# Patient Record
Sex: FEMALE | Race: OTHER | Employment: OTHER | URBAN - METROPOLITAN AREA
[De-identification: names, ages, dates, MRNs, and addresses within clinical notes are randomized per-mention and may not be internally consistent; named-entity substitution may affect disease eponyms.]

---

## 2017-01-17 ENCOUNTER — ANESTHESIA EVENT (OUTPATIENT)
Dept: SURGERY | Facility: AMBULARY SURGERY CENTER | Age: 62
End: 2017-01-17
Payer: MEDICARE

## 2017-01-17 ENCOUNTER — TELEPHONE (OUTPATIENT)
Dept: OPHTHALMOLOGY | Facility: CLINIC | Age: 62
End: 2017-01-17

## 2017-01-17 NOTE — TELEPHONE ENCOUNTER
----- Message from Venice Molina sent at 1/17/2017 12:08 PM CST -----  Contact: Loulou with Dr. Josh Jamil with Dr. Moreno office would like to speak to a nurse regarding a surgery clearance  She faxed paper work yesterday and she received something today   Please call  options 6   Thanks

## 2017-01-18 ENCOUNTER — ANESTHESIA (OUTPATIENT)
Dept: SURGERY | Facility: AMBULARY SURGERY CENTER | Age: 62
End: 2017-01-18
Payer: MEDICARE

## 2017-01-18 ENCOUNTER — SURGERY (OUTPATIENT)
Age: 62
End: 2017-01-18

## 2017-01-18 PROBLEM — H25.10 NUCLEAR SCLEROSIS: Status: ACTIVE | Noted: 2017-01-18

## 2017-01-18 PROCEDURE — D9220A PRA ANESTHESIA: Mod: CRNA,,, | Performed by: NURSE ANESTHETIST, CERTIFIED REGISTERED

## 2017-01-18 PROCEDURE — D9220A PRA ANESTHESIA: Mod: ANES,,, | Performed by: ANESTHESIOLOGY

## 2017-01-18 RX ADMIN — ALFENTANIL HYDROCHLORIDE 500 MCG: 500 INJECTION INTRAVENOUS at 10:01

## 2017-01-18 RX ADMIN — ONDANSETRON 4 MG: 2 INJECTION INTRAMUSCULAR; INTRAVENOUS at 09:01

## 2017-01-18 RX ADMIN — ALFENTANIL HYDROCHLORIDE 500 MCG: 500 INJECTION INTRAVENOUS at 09:01

## 2017-01-18 RX ADMIN — MOXIFLOXACIN 1 DROP: 5 SOLUTION/ DROPS OPHTHALMIC at 10:01

## 2017-01-18 RX ADMIN — PROPARACAINE HYDROCHLORIDE 1 DROP: 5 SOLUTION/ DROPS OPHTHALMIC at 09:01

## 2017-01-18 RX ADMIN — EPINEPHRINE CONVENIENCE KIT 0.3 MG: KIT INTRAMUSCULAR; SUBCUTANEOUS at 09:01

## 2017-01-18 RX ADMIN — LIDOCAINE HYDROCHLORIDE 1 ML: 10 INJECTION, SOLUTION EPIDURAL; INFILTRATION; INTRACAUDAL; PERINEURAL at 10:01

## 2017-01-18 RX ADMIN — SODIUM CHLORIDE, SODIUM LACTATE, POTASSIUM CHLORIDE, CALCIUM CHLORIDE: 600; 310; 30; 20 INJECTION, SOLUTION INTRAVENOUS at 09:01

## 2017-01-18 NOTE — ANESTHESIA PREPROCEDURE EVALUATION
01/18/2017  Alida Chacon is a 61 y.o., female.    [facility] Anesthesia Evaluation    I have reviewed the Patient Summary Reports.    I have reviewed the Nursing Notes.   I have reviewed the Medications.     Review of Systems  Anesthesia Hx:  Hx of Anesthetic complications  Personal Hx of Anesthesia complications  Paradoxical Response To Benzodiazapines (Versed)       Physical Exam  General:  Morbid Obesity    Airway/Jaw/Neck:  Airway Findings: Mouth Opening: Normal Tongue: Normal  General Airway Assessment: Adult, Average  Mallampati: II  TM Distance: Normal, at least 6 cm       Chest/Lungs:  Chest/Lungs Findings: Clear to auscultation, Normal Respiratory Rate     Heart/Vascular:  Heart Findings:       Mental Status:  Mental Status Findings:  Cooperative, Alert and Oriented         Anesthesia Plan  Type of Anesthesia, risks & benefits discussed:  Anesthesia Type:  MAC  Patient's Preference:   Intra-op Monitoring Plan:   Intra-op Monitoring Plan Comments:   Post Op Pain Control Plan:   Post Op Pain Control Plan Comments:   Induction:   IV  Beta Blocker:  Patient is not currently on a Beta-Blocker (No further documentation required).       Informed Consent: Patient understands risks and agrees with Anesthesia plan.  Questions answered. Anesthesia consent signed with patient.  ASA Score: 3     Day of Surgery Review of History & Physical: I have interviewed and examined the patient. I have reviewed the patient's H&P dated:  There are no significant changes.          Ready For Surgery From Anesthesia Perspective.

## 2017-01-18 NOTE — TRANSFER OF CARE
"Anesthesia Transfer of Care Note    Patient: Alida Chacon    Procedure(s) Performed: Procedure(s) (LRB):  phaco/pciol (Right)    Patient location: PACU    Anesthesia Type: MAC    Transport from OR: Transported from OR on room air with adequate spontaneous ventilation    Post pain: adequate analgesia    Post assessment: no apparent anesthetic complications and tolerated procedure well    Post vital signs: stable    Level of consciousness: awake    Nausea/Vomiting: no nausea/vomiting    Complications: none          Last vitals:   Visit Vitals    /62 (BP Location: Right arm, Patient Position: Lying)    Pulse (!) 52    Temp 36.6 °C (97.9 °F) (Oral)    Resp 20    Ht 5' 3" (1.6 m)    Wt 108.9 kg (240 lb)    SpO2 99%    Breastfeeding No    BMI 42.51 kg/m2     "

## 2017-01-18 NOTE — ANESTHESIA POSTPROCEDURE EVALUATION
"Anesthesia Post Evaluation    Patient: Alida Chacon    Procedure(s) Performed: Procedure(s) (LRB):  phaco/pciol (Right)    Final Anesthesia Type: general  Patient location during evaluation: PACU  Patient participation: Yes- Able to Participate  Level of consciousness: awake and alert and oriented  Post-procedure vital signs: reviewed and stable  Pain management: adequate  Airway patency: patent  PONV status at discharge: No PONV  Anesthetic complications: no      Cardiovascular status: blood pressure returned to baseline  Respiratory status: unassisted, spontaneous ventilation and room air  Hydration status: euvolemic  Follow-up not needed.        Visit Vitals    /67    Pulse 64    Temp 36.6 °C (97.8 °F) (Oral)    Resp 20    Ht 5' 3" (1.6 m)    Wt 108.9 kg (240 lb)    SpO2 95%    Breastfeeding No    BMI 42.51 kg/m2       Pain/Surya Score: Pain Assessment Performed: Yes (1/18/2017 10:50 AM)  Presence of Pain: denies (1/18/2017 10:50 AM)  Surya Score: 10 (1/18/2017 10:50 AM)      "

## 2017-01-19 ENCOUNTER — OFFICE VISIT (OUTPATIENT)
Dept: OPHTHALMOLOGY | Facility: CLINIC | Age: 62
End: 2017-01-19
Payer: MEDICARE

## 2017-01-19 DIAGNOSIS — H25.13 NUCLEAR SCLEROSIS, BILATERAL: ICD-10-CM

## 2017-01-19 DIAGNOSIS — H52.203 MYOPIA WITH ASTIGMATISM AND PRESBYOPIA, BILATERAL: ICD-10-CM

## 2017-01-19 DIAGNOSIS — H52.4 MYOPIA WITH ASTIGMATISM AND PRESBYOPIA, BILATERAL: ICD-10-CM

## 2017-01-19 DIAGNOSIS — H52.13 MYOPIA WITH ASTIGMATISM AND PRESBYOPIA, BILATERAL: ICD-10-CM

## 2017-01-19 DIAGNOSIS — Z98.890 POST-OPERATIVE STATE: Primary | ICD-10-CM

## 2017-01-19 PROCEDURE — 99214 OFFICE O/P EST MOD 30 MIN: CPT | Mod: PBBFAC,PO | Performed by: OPHTHALMOLOGY

## 2017-01-19 PROCEDURE — 99999 PR PBB SHADOW E&M-EST. PATIENT-LVL IV: CPT | Mod: PBBFAC,,, | Performed by: OPHTHALMOLOGY

## 2017-01-19 PROCEDURE — 99024 POSTOP FOLLOW-UP VISIT: CPT | Mod: ,,, | Performed by: OPHTHALMOLOGY

## 2017-01-19 RX ORDER — PANCRELIPASE 24000; 76000; 120000 [USP'U]/1; [USP'U]/1; [USP'U]/1
CAPSULE, DELAYED RELEASE PELLETS ORAL
COMMUNITY
Start: 2016-11-28 | End: 2018-05-09 | Stop reason: SDUPTHER

## 2017-01-19 RX ORDER — LINACLOTIDE 290 UG/1
290 CAPSULE, GELATIN COATED ORAL
COMMUNITY
Start: 2016-11-28 | End: 2019-07-01

## 2017-01-19 RX ORDER — TIMOLOL MALEATE 5 MG/ML
1 SOLUTION/ DROPS OPHTHALMIC 2 TIMES DAILY
Qty: 5 ML | Refills: 1 | Status: SHIPPED | OUTPATIENT
Start: 2017-01-19 | End: 2017-01-23 | Stop reason: ALTCHOICE

## 2017-01-19 RX ORDER — CIPROFLOXACIN AND DEXAMETHASONE 3; 1 MG/ML; MG/ML
SUSPENSION/ DROPS AURICULAR (OTIC)
COMMUNITY
Start: 2016-12-01 | End: 2018-11-13

## 2017-01-19 NOTE — PROGRESS NOTES
HPI     Post-op Evaluation    Additional comments: 1 d po phaco iol OD d 1/18//           Comments   1 d po phaco iol OD d 1/18//  Drops instilled as directed//    Agree with above. Keeps getting flashes through the night, like optical   migraine - was getting before surgery as well.        Last edited by Steffanie Caraballo MD on 1/19/2017  8:57 AM.   (History)            Assessment /Plan     For exam results, see Encounter Report.    Post-operative state    Nuclear sclerosis, bilateral    Myopia with astigmatism and presbyopia, bilateral    Other orders  -     timolol maleate 0.5% (TIMOPTIC) 0.5 % Drop; Place 1 drop into the right eye 2 (two) times daily.  Dispense: 5 mL; Refill: 1            Nuclear sclerosis, bilateral - POD #1 s/p Phaco/PCIOL OD. IOP up a bit, otherwise doing well. Continue Besifloxacin QID, Lotemax QID, and Ilevro QD, add Timolol BID through Sunday am. Precautions reviewed. RTC 1 week  dilates pretty well on just 1%, denies flomax. Has a lot of allergies did ok with Besivance ahead of time with NIRAJ. Nucleus does not look terribly dense, but hazy view of macula and dim red reflex on retinoscopy OD only. 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 Has worn contacts in past. [employer] -[employer]. Lid hygiene handout given prior visit.    Myopia with astigmatism and presbyopia, bilateral - Cannot tolerate progressives, prefers to do needlework without glasses. Has worn contacts in past. [employer] -[employer]00.

## 2017-01-19 NOTE — PATIENT INSTRUCTIONS
Continue Ilevro once daily. QD, add Timolol BID through Sunday am.    Continue other surgery drops 4x per day (Besifloxacin, Lotemax).    Use Timolol (pressure drop, yellow lid) 2x per day until Sunday morning then stop. CONTINUE ALL OTHER DROPS.     Call MD immediately if signs of infection occur (pain, redness, blurred vision). Do not wait for next appointment.    Call MD immediately if you experience new floaters/shadows in your vision, flashes of light, or a curtain or veil appearing to in your vision.    Cell number [phone].

## 2017-01-20 ENCOUNTER — TELEPHONE (OUTPATIENT)
Dept: OPHTHALMOLOGY | Facility: CLINIC | Age: 62
End: 2017-01-20

## 2017-01-20 NOTE — TELEPHONE ENCOUNTER
----- Message from Nina Hsu sent at 1/20/2017 11:37 AM CST -----  Contact: [phone]  Pt is calling to speak with the nurse regarding the eye drops given to her.pleae call to advise/thanks

## 2017-01-23 ENCOUNTER — OFFICE VISIT (OUTPATIENT)
Dept: OPHTHALMOLOGY | Facility: CLINIC | Age: 62
End: 2017-01-23
Payer: MEDICARE

## 2017-01-23 DIAGNOSIS — Z98.890 POST-OPERATIVE STATE: Primary | ICD-10-CM

## 2017-01-23 DIAGNOSIS — H52.4 MYOPIA WITH ASTIGMATISM AND PRESBYOPIA, BILATERAL: ICD-10-CM

## 2017-01-23 DIAGNOSIS — H25.13 NUCLEAR SCLEROSIS, BILATERAL: ICD-10-CM

## 2017-01-23 DIAGNOSIS — H52.203 MYOPIA WITH ASTIGMATISM AND PRESBYOPIA, BILATERAL: ICD-10-CM

## 2017-01-23 DIAGNOSIS — H52.13 MYOPIA WITH ASTIGMATISM AND PRESBYOPIA, BILATERAL: ICD-10-CM

## 2017-01-23 PROCEDURE — 99024 POSTOP FOLLOW-UP VISIT: CPT | Mod: ,,, | Performed by: OPHTHALMOLOGY

## 2017-01-23 PROCEDURE — 99214 OFFICE O/P EST MOD 30 MIN: CPT | Mod: PBBFAC,PO | Performed by: OPHTHALMOLOGY

## 2017-01-23 PROCEDURE — 99999 PR PBB SHADOW E&M-EST. PATIENT-LVL IV: CPT | Mod: PBBFAC,,, | Performed by: OPHTHALMOLOGY

## 2017-01-23 NOTE — PATIENT INSTRUCTIONS
Continue Ilevro daily until 2/17/17    Continue Besifloxacin 4x per day until 1/27/17    Continue Lotemax 4x per day until 1/27, then decrease to 3x per day for 1 week, then 2x per day for 1 week, then once daily for 1 week then stop.

## 2017-01-23 NOTE — PROGRESS NOTES
HPI     Post-op Evaluation    Additional comments: phaco iol OD d 1/18/17//  drops instilled as   directed//           Comments   phaco iol OD d 1/18/17//  drops instilled as directed//    Agree with above. Last dose of Timolol yesterday am as instructed.       Last edited by Steffanie Caraballo MD on 1/23/2017  2:16 PM.   (History)            Assessment /Plan     For exam results, see Encounter Report.    Post-operative state    Nuclear sclerosis, bilateral    Myopia with astigmatism and presbyopia, bilateral          Nuclear sclerosis, bilateral - POD #5 s/p Phaco/PCIOL OD. IOP good off Timolol, doing well. Continue Besifloxacin QID, Lotemax QID, and Ilevro QD until Friday, then d/c Besifloxacin, taper Lotemax and continue Ilevro QD.  dilates pretty well on just 1%, denies flomax. Has a lot of allergies did ok with Besivance ahead of time with [facility]AJ. Nucleus does not look terribly dense, but hazy view of macula and dim red reflex on retinoscopy OD only. 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 Has worn contacts in past. [employer] -3[employer]00. Lid hygiene handout given prior visit.    Myopia with astigmatism and presbyopia, bilateral - Cannot tolerate progressives, prefers to do needlework without glasses. Has worn contacts in past. [employer] -3.00.

## 2017-01-23 NOTE — MR AVS SNAPSHOT
[place] [address] - Ophthalmology  [address] [place] [address] Drive [address]  [place] [address]  Phone: [phone]                  Alida Chacon   2017 2:00 PM   Office Visit    Description:  Female : 1955   Provider:  Steffanie Caraballo MD   Department:  [place] [facility] - Ophthalmology           Reason for Visit     Post-op Evaluation           Diagnoses this Visit        Comments    Post-operative state    -  Primary     Nuclear sclerosis, bilateral         Myopia with astigmatism and presbyopia, bilateral                To Do List           Future Appointments        Provider Department Dept Phone    3/2/2017 10:15 AM [place] [facility] [address] [facility] [phone]      Goals (5 Years of Data)     None      Follow-Up and Disposition     Return for Post op visit.      [facility]     [facility] Nurse Care [facility] -  Assistance  Registered nurses in the [facility] On Call Center provide clinical advisement, health education, appointment booking, and other advisory services.  Call for this free service at [phone].             Medications           Message regarding Medications     Verify the changes and/or additions to your medication regime listed below are the same as discussed with your clinician today.  If any of these changes or additions are incorrect, please notify your healthcare provider.        STOP taking these medications     timolol maleate 0.5% (TIMOPTIC) 0.5 % Drop Place 1 drop into the right eye 2 (two) times daily.           Verify that the below list of medications is an accurate representation of the medications you are currently taking.  If none reported, the list may be blank. If incorrect, please contact your healthcare provider. Carry this list with you in case of emergency.           Current Medications     alendronate (FOSAMAX) 70 MG tablet TK 1 T PO ONCE WEEKLY    besifloxacin (BESIVANCE) 0.6 % DrpS Place 1 drop into the right  eye 4 (four) times daily. Start 1 day before surgery.    calcium carbonate (OS-JACQUELINE) 600 mg (1,500 mg) Tab Take 600 mg by mouth 2 (two) times daily with meals.    cetirizine (ZYRTEC) 10 MG tablet Take 10 mg by mouth daily as needed for Allergies.    CIPRODEX 0.3-0.1 % DrpS     CREON 24,000-76,000 -120,000 unit capsule     diazePAM (VALIUM) 5 MG tablet TK 1 T PO Q 8 H PRN    docusate sodium (COLACE) 100 MG capsule Take 100 mg by mouth 2 (two) times daily.    epinephrine (EPIPEN JR) 0.15 mg/0.3 mL pen injection Inject 0.15 mg into the muscle as needed for Anaphylaxis.    ergocalciferol (ERGOCALCIFEROL) 50,000 unit Cap TK 1 C PO  Q WEEKLY    ethacrynic acid (EDECRIN) 25 mg Tab Take 25 mg by mouth 2 (two) times daily.    irbesartan (AVAPRO) 150 MG tablet TK 1 T PO QD    levocetirizine (XYZAL) 5 MG tablet Take 5 mg by mouth every evening.    LINZESS 290 mcg Cap     loteprednol etabonate 0.5 % DrpG Place 1 drop into the right eye 4 (four) times daily. Start right after surgery.    magnesium oxide (MAG-OX) 400 mg tablet Take 400 mg by mouth once daily.    nepafenac (ILEVRO) 0.3 % DrpS Place 1 drop into the right eye once daily. Start 3 days before surgery.    omeprazole (PRILOSEC) 40 MG capsule TK 1 C PO  QD    ondansetron (ZOFRAN) 4 MG tablet Take 8 mg by mouth 2 (two) times daily.    oxycodone (ROXICODONE) 15 MG Tab TK 1 T PO QID PRN    OXYCONTIN 20 mg 12 hr tablet TK 1 T PO Q 12 H    OXYGEN-AIR DELIVERY SYSTEMS MISC 2 L by Nasal route nightly as needed.    predniSONE (DELTASONE) 5 MG tablet TAKE ONE TABLET BY MOUTHY DAILY WITH FOOD    promethazine (PHENERGAN) 25 MG tablet Take 25 mg by mouth every 4 (four) hours as needed for Nausea.    tacrolimus (PROGRAF) 1 MG Cap TK 3 CS PO BID    triazolam (HALCION) 0.25 MG Tab Take 0.125 mg by mouth nightly as needed.    warfarin (COUMADIN) 7.5 MG tablet TK 1 T PO QD AT 5 PM           Clinical Reference Information           Allergies as of 1/23/2017     Versed [Midazolam]     Compazine [Prochlorperazine Edisylate]    Cortisone    Cytoxan [Cyclophosphamide]    Lasix [Furosemide]    Morphine    Penicillins    Preservatives [Preservative]    Sulfa (Sulfonamide Antibiotics)    Tetracyclines    Vicodin [Hydrocodone-acetaminophen]      Immunizations Administered on Date of Encounter - 1/23/2017     None      Instructions      Continue Ilevro daily until 2/17/17    Continue Besifloxacin 4x per day until 1/27/17    Continue Lotemax 4x per day until 1/27, then decrease to 3x per day for 1 week, then 2x per day for 1 week, then once daily for 1 week then stop.

## 2017-01-27 RX ORDER — LOTEPREDNOL ETABONATE 5 MG/G
1 GEL OPHTHALMIC 4 TIMES DAILY
Qty: 5 G | Refills: 2
Start: 2017-01-27 | End: 2017-03-02

## 2017-01-27 NOTE — TELEPHONE ENCOUNTER
----- Message from Marilynn Huff sent at 1/27/2017  9:22 AM CST -----  Contact: pt [phone]  Patient called and asked if you will ida in a new prescription for Lotemax .5% gel  [facility] [place]

## 2017-03-02 ENCOUNTER — OFFICE VISIT (OUTPATIENT)
Dept: OPHTHALMOLOGY | Facility: CLINIC | Age: 62
End: 2017-03-02
Payer: MEDICARE

## 2017-03-02 DIAGNOSIS — H52.203 MYOPIA WITH ASTIGMATISM AND PRESBYOPIA, BILATERAL: ICD-10-CM

## 2017-03-02 DIAGNOSIS — Z98.890 POST-OPERATIVE STATE: Primary | ICD-10-CM

## 2017-03-02 DIAGNOSIS — H25.13 NUCLEAR SCLEROSIS, BILATERAL: ICD-10-CM

## 2017-03-02 DIAGNOSIS — H52.4 MYOPIA WITH ASTIGMATISM AND PRESBYOPIA, BILATERAL: ICD-10-CM

## 2017-03-02 DIAGNOSIS — H52.13 MYOPIA WITH ASTIGMATISM AND PRESBYOPIA, BILATERAL: ICD-10-CM

## 2017-03-02 PROCEDURE — 99999 PR PBB SHADOW E&M-EST. PATIENT-LVL IV: CPT | Mod: PBBFAC,,, | Performed by: OPHTHALMOLOGY

## 2017-03-02 PROCEDURE — 99024 POSTOP FOLLOW-UP VISIT: CPT | Mod: ,,, | Performed by: OPHTHALMOLOGY

## 2017-03-02 PROCEDURE — 99214 OFFICE O/P EST MOD 30 MIN: CPT | Mod: PBBFAC,PO | Performed by: OPHTHALMOLOGY

## 2017-03-02 RX ORDER — OXYCODONE HYDROCHLORIDE 10 MG/1
TABLET ORAL
COMMUNITY
Start: 2017-02-27 | End: 2018-11-05 | Stop reason: CLARIF

## 2017-03-02 NOTE — PROGRESS NOTES
"HPI     Post-op Evaluation    Additional comments: 1 month sp phaco iol OD d 1/18//           Eye Problem    Additional comments: pt eyes are burning on and off//  since finished   drops//           Comments   1 month sp phaco iol OD d 1/18//  Drops finished as directed//pt eyes are   burning on and off//  since finished drops//       Last edited by Ofelia Rodarte on 3/2/2017 10:21 AM. (History)            Assessment /Plan     For exam results, see Encounter Report.    Post-operative state    Nuclear sclerosis, bilateral    Myopia with astigmatism and presbyopia, bilateral            Nuclear sclerosis, bilateral - s/p Phaco/PCIOL OD. IOP good off Timolol, doing well. I recommend that you use artificial tears 2-3 times daily. Recommended brands include Systane, Refresh, Genteal, and Thera-tears. Generic drops are okay too. Avoid any drops that say they "get the red out" - these should not be used on a regular basis.    dilates pretty well on just 1%, denies flomax. Has a lot of allergies did ok with Besivance ahead of time with NIRAJ. Nucleus does not look terribly dense, but hazy view of macula and dim red reflex on retinoscopy OD only. 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 Has worn contacts in past. [employer] -3.00. Lid hygiene handout given prior visit.    Myopia with astigmatism and presbyopia, bilateral - Cannot tolerate progressives, prefers to do needlework without glasses. Has worn contacts in past. [employer] -3.00. MRx given today.                     "

## 2017-03-02 NOTE — MR AVS SNAPSHOT
[facility] - Ophthalmology  [address] [place] [address] Drive [address]  [place] [address]  Phone: [phone]                  Alida Chacon   3/2/2017 10:15 AM   Office Visit    Description:  Female : 1955   Provider:  Steffanie Caraballo MD   Department:  [place] [address] - [place]           Reason for Visit     Post-op Evaluation     Eye Problem           Diagnoses this Visit        Comments    Post-operative state    -  Primary     Nuclear sclerosis, bilateral         Myopia with astigmatism and presbyopia, bilateral                To Do List           Goals (5 Years of Data)     None      Follow-Up and Disposition     Return in about 1 year (around 3/2/2018) for eye exam - optometry ok.      [facility] On Call     [facility] On Call Nurse Care Line -  Assistance  Registered nurses in the [facility] On Call Center provide clinical advisement, health education, appointment booking, and other advisory services.  Call for this free service at [phone].             Medications           Message regarding Medications     Verify the changes and/or additions to your medication regime listed below are the same as discussed with your clinician today.  If any of these changes or additions are incorrect, please notify your healthcare provider.        STOP taking these medications     loteprednol etabonate 0.5 % DrpG Place 1 drop into the right eye 4 (four) times daily. Start right after surgery.           Verify that the below list of medications is an accurate representation of the medications you are currently taking.  If none reported, the list may be blank. If incorrect, please contact your healthcare provider. Carry this list with you in case of emergency.           Current Medications     alendronate (FOSAMAX) 70 MG tablet TK 1 T PO ONCE WEEKLY    calcium carbonate (OS-JACQUELINE) 600 mg (1,500 mg) Tab Take 600 mg by mouth 2 (two) times daily with meals.    cetirizine (ZYRTEC) 10 MG tablet Take  10 mg by mouth daily as needed for Allergies.    CIPRODEX 0.3-0.1 % DrpS     CREON 24,000-76,000 -120,000 unit capsule     diazePAM (VALIUM) 5 MG tablet TK 1 T PO Q 8 H PRN    docusate sodium (COLACE) 100 MG capsule Take 100 mg by mouth 2 (two) times daily.    epinephrine (EPIPEN JR) 0.15 mg/0.3 mL pen injection Inject 0.15 mg into the muscle as needed for Anaphylaxis.    ergocalciferol (ERGOCALCIFEROL) 50,000 unit Cap TK 1 C PO  Q WEEKLY    ethacrynic acid (EDECRIN) 25 mg Tab Take 25 mg by mouth 2 (two) times daily.    irbesartan (AVAPRO) 150 MG tablet TK 1 T PO QD    levocetirizine (XYZAL) 5 MG tablet Take 5 mg by mouth every evening.    LINZESS 290 mcg Cap     magnesium oxide (MAG-OX) 400 mg tablet Take 400 mg by mouth once daily.    omeprazole (PRILOSEC) 40 MG capsule TK 1 C PO  QD    ondansetron (ZOFRAN) 4 MG tablet Take 8 mg by mouth 2 (two) times daily.    oxycodone (ROXICODONE) 10 mg Tab immediate release tablet     oxycodone (ROXICODONE) 15 MG Tab TK 1 T PO QID PRN    OXYCONTIN 20 mg 12 hr tablet TK 1 T PO Q 12 H    OXYGEN-AIR DELIVERY SYSTEMS MISC 2 L by Nasal route nightly as needed.    predniSONE (DELTASONE) 5 MG tablet TAKE ONE TABLET BY MOUTHY DAILY WITH FOOD    promethazine (PHENERGAN) 25 MG tablet Take 25 mg by mouth every 4 (four) hours as needed for Nausea.    tacrolimus (PROGRAF) 1 MG Cap TK 3 CS PO BID    triazolam (HALCION) 0.25 MG Tab Take 0.125 mg by mouth nightly as needed.    warfarin (COUMADIN) 7.5 MG tablet TK 1 T PO QD AT 5 PM           Clinical Reference Information           Allergies as of 3/2/2017     Versed [Midazolam]    Compazine [Prochlorperazine Edisylate]    Cortisone    Cytoxan [Cyclophosphamide]    Lasix [Furosemide]    Morphine    Penicillins    Preservatives [Preservative]    Sulfa (Sulfonamide Antibiotics)    Tetracyclines    Vicodin [Hydrocodone-acetaminophen]      Immunizations Administered on Date of Encounter - 3/2/2017     None      Instructions      I recommend that  "you use artificial tears 2-3 times daily. Recommended brands include Systane, Refresh, Genteal, and Thera-tears. Generic drops are okay too. Avoid any drops that say they "get the red out" - these should not be used on a regular basis.         Language Assistance Services     ATTENTION: Language assistance services are available, free of charge. Please call [phone].      ATENCIÓN: Si habla winnieañol, tiene a allen disposición servicios gratuitos de asistencia lingüística. Llame al [phone].     [facility] Ý: N?u b?n nói Ti?ng Vi?t, có các d?ch v? h? tr? ngôn ng? mi?n phí dành cho b?n. G?i s? [phone].         [facility] complies with applicable Federal civil rights laws and does not discriminate on the basis of race, color, national origin, age, disability, or sex.        "

## 2017-08-25 DIAGNOSIS — M25.552 HIP PAIN, LEFT: ICD-10-CM

## 2017-08-25 DIAGNOSIS — M48.061 SPINAL STENOSIS OF LUMBAR REGION: ICD-10-CM

## 2017-08-25 DIAGNOSIS — M54.16 LUMBAR RADICULOPATHY: Primary | ICD-10-CM

## 2018-04-12 ENCOUNTER — TELEPHONE (OUTPATIENT)
Dept: FAMILY MEDICINE | Facility: CLINIC | Age: 63
End: 2018-04-12

## 2018-04-12 NOTE — TELEPHONE ENCOUNTER
----- Message from Fadumo Andersen sent at 4/12/2018  8:31 AM CDT -----  Contact: Aparna   Name of Who is Calling: [facility]      What is the request in detail: She needs some clinical question answered  concerning the Patient Medication request that was received on yesterday       Can the clinic reply by [facility]: No       What Number to Call Back if not in [facility]: [phone]

## 2018-04-23 ENCOUNTER — TELEPHONE (OUTPATIENT)
Dept: FAMILY MEDICINE | Facility: CLINIC | Age: 63
End: 2018-04-23

## 2018-04-23 RX ORDER — TACROLIMUS 1 MG/1
3 CAPSULE ORAL EVERY 12 HOURS
Qty: 30 CAPSULE | Refills: 0 | Status: SHIPPED | OUTPATIENT
Start: 2018-04-23 | End: 2018-11-05

## 2018-04-23 NOTE — TELEPHONE ENCOUNTER
----- Message from Mickie Brooke sent at 4/23/2018 12:10 PM CDT -----  Prograf 1 mgs ..asking for an initial prescriptions/ [facility] / fax [phone]  [phone]

## 2018-04-23 NOTE — TELEPHONE ENCOUNTER
----- Message from Dee Araya sent at 4/23/2018  4:35 PM CDT -----  Contact: Yvonne with [facility]  Yvonne with [facility] calling to let Yvonne Eng know that the prescription that was faxed over was sent to the wrong place, this is a speciality pharmacy and they do not carry that drug. Please advise. Thanks.

## 2018-04-24 ENCOUNTER — TELEPHONE (OUTPATIENT)
Dept: FAMILY MEDICINE | Facility: CLINIC | Age: 63
End: 2018-04-24

## 2018-04-24 NOTE — TELEPHONE ENCOUNTER
----- Message from Lisa Whitfield sent at 4/24/2018  7:48 AM CDT -----  Type:  Pharmacy Calling to Clarify an RX    Name of Caller:  maritza   [facility]   Prescription Name:  progras 1  mg  What do they need to clarify?: chantale  Best Call Back Number: [phone]  Additional Information:   Calling   For a  Refill  Fax  To: [phone]

## 2018-04-24 NOTE — TELEPHONE ENCOUNTER
----- Message from RT Candido sent at 4/24/2018 11:32 AM CDT -----  Contact: Angelito,[phone], [facility]  Angelito,[phone], [facility], requesting medication clarification, thanks.

## 2018-04-24 NOTE — TELEPHONE ENCOUNTER
----- Message from Elana Carson sent at 4/24/2018  3:36 PM CDT -----  Contact: Angelito with [facility]  Angelito with [facility] pharmacy (Angelito)[phone] or  (Amy) [phone] called regarding Progras for above patient. They are requesting one of the following: Rx clarification. Angelito wanted to make sure Soumya was aware they can fill this as a 90 day supply if she would like.  Thanks!

## 2018-04-26 ENCOUNTER — TELEPHONE (OUTPATIENT)
Dept: FAMILY MEDICINE | Facility: CLINIC | Age: 63
End: 2018-04-26

## 2018-04-26 NOTE — TELEPHONE ENCOUNTER
Dr. Snell, Will you please look into this and write the rest of her medication?  Thank you, [place]

## 2018-04-26 NOTE — TELEPHONE ENCOUNTER
----- Message from Ce Kent sent at 4/26/2018  2:01 PM CDT -----  transplant drug was only written for 5 days instead of 90, karine nj..[phone] (home)

## 2018-04-27 NOTE — TELEPHONE ENCOUNTER
VO given to [facility], pharmacist Ms. Loco,  for #540 Prograf 1 mg capsule, take three capsules by mouth twice a day. Pt notified.  [place]

## 2018-05-09 RX ORDER — IRBESARTAN 150 MG/1
150 TABLET ORAL DAILY
Qty: 90 TABLET | Refills: 3 | Status: SHIPPED | OUTPATIENT
Start: 2018-05-09 | End: 2019-05-10 | Stop reason: SDUPTHER

## 2018-05-09 RX ORDER — PREDNISONE 5 MG/1
5 TABLET ORAL DAILY
Qty: 90 TABLET | Refills: 3 | Status: SHIPPED | OUTPATIENT
Start: 2018-05-09 | End: 2019-05-06 | Stop reason: SDUPTHER

## 2018-05-09 RX ORDER — PANCRELIPASE 24000; 76000; 120000 [USP'U]/1; [USP'U]/1; [USP'U]/1
2 CAPSULE, DELAYED RELEASE PELLETS ORAL
Qty: 540 CAPSULE | Refills: 3 | Status: SHIPPED | OUTPATIENT
Start: 2018-05-09 | End: 2019-06-28 | Stop reason: SDUPTHER

## 2018-05-21 ENCOUNTER — TELEPHONE (OUTPATIENT)
Dept: FAMILY MEDICINE | Facility: CLINIC | Age: 63
End: 2018-05-21

## 2018-05-21 NOTE — TELEPHONE ENCOUNTER
----- Message from Bill Pantoja sent at 5/21/2018  4:01 PM CDT -----  Contact: Guerda with [facility]  Guerda with [facility], [phone], fax [phone]. Calling to have echo cardiogram faxed to them. Please advise. Thanks.

## 2018-07-02 ENCOUNTER — LAB VISIT (OUTPATIENT)
Dept: LAB | Facility: HOSPITAL | Age: 63
End: 2018-07-02
Attending: FAMILY MEDICINE
Payer: MEDICARE

## 2018-07-02 ENCOUNTER — OFFICE VISIT (OUTPATIENT)
Dept: FAMILY MEDICINE | Facility: CLINIC | Age: 63
End: 2018-07-02
Payer: MEDICARE

## 2018-07-02 VITALS
BODY MASS INDEX: 45.36 KG/M2 | DIASTOLIC BLOOD PRESSURE: 87 MMHG | WEIGHT: 256 LBS | HEART RATE: 56 BPM | OXYGEN SATURATION: 98 % | HEIGHT: 63 IN | SYSTOLIC BLOOD PRESSURE: 162 MMHG | RESPIRATION RATE: 18 BRPM

## 2018-07-02 DIAGNOSIS — R51.9 CHRONIC NONINTRACTABLE HEADACHE, UNSPECIFIED HEADACHE TYPE: ICD-10-CM

## 2018-07-02 DIAGNOSIS — M16.12 PRIMARY OSTEOARTHRITIS OF LEFT HIP: Primary | ICD-10-CM

## 2018-07-02 DIAGNOSIS — R53.82 CHRONIC FATIGUE: ICD-10-CM

## 2018-07-02 DIAGNOSIS — G89.29 CHRONIC NONINTRACTABLE HEADACHE, UNSPECIFIED HEADACHE TYPE: ICD-10-CM

## 2018-07-02 DIAGNOSIS — T78.40XS SEVERE ALLERGIC REACTION, SEQUELA: ICD-10-CM

## 2018-07-02 LAB — TSH SERPL DL<=0.005 MIU/L-ACNC: 1.68 UIU/ML

## 2018-07-02 PROCEDURE — 84443 ASSAY THYROID STIM HORMONE: CPT

## 2018-07-02 PROCEDURE — 99999 PR PBB SHADOW E&M-EST. PATIENT-LVL III: CPT | Mod: PBBFAC,,, | Performed by: FAMILY MEDICINE

## 2018-07-02 PROCEDURE — 99213 OFFICE O/P EST LOW 20 MIN: CPT | Mod: PBBFAC,PN | Performed by: FAMILY MEDICINE

## 2018-07-02 PROCEDURE — 36415 COLL VENOUS BLD VENIPUNCTURE: CPT

## 2018-07-02 PROCEDURE — 99214 OFFICE O/P EST MOD 30 MIN: CPT | Mod: S$PBB,,, | Performed by: FAMILY MEDICINE

## 2018-07-02 RX ORDER — LORAZEPAM 0.5 MG/1
TABLET ORAL
COMMUNITY
End: 2019-01-02 | Stop reason: ALTCHOICE

## 2018-07-02 RX ORDER — CIPROFLOXACIN AND DEXAMETHASONE 3; 1 MG/ML; MG/ML
SUSPENSION/ DROPS AURICULAR (OTIC)
COMMUNITY
End: 2018-11-13

## 2018-07-02 RX ORDER — CETIRIZINE HYDROCHLORIDE 10 MG/1
10 TABLET ORAL DAILY PRN
Qty: 90 TABLET | Refills: 3 | COMMUNITY
Start: 2018-07-02

## 2018-07-02 RX ORDER — NALOXONE HYDROCHLORIDE 4 MG/.1ML
SPRAY NASAL
COMMUNITY

## 2018-07-02 RX ORDER — WARFARIN SODIUM 5 MG/1
5 TABLET ORAL DAILY
COMMUNITY
Start: 2018-05-08 | End: 2019-05-06 | Stop reason: SDUPTHER

## 2018-07-02 RX ORDER — ONDANSETRON 4 MG/1
8 TABLET, FILM COATED ORAL 2 TIMES DAILY
Qty: 180 TABLET | Refills: 3 | Status: SHIPPED | OUTPATIENT
Start: 2018-07-02 | End: 2019-11-11 | Stop reason: SDUPTHER

## 2018-07-02 NOTE — PROGRESS NOTES
"Subjective:       Patient ID: Alida Chacon is a 62 y.o. female.    Chief Complaint: Follow-up    Patient presents for follow up.    Having terrible hip pain, left, having a decrease in ROM, function, and unable to complete all ADL's due to pain.    Also having severe fatigue, mostly due to pain.          Review of Systems   Constitutional: Positive for fatigue. Negative for activity change, appetite change, chills and fever.   HENT: Negative for congestion, dental problem, facial swelling, nosebleeds, postnasal drip, sinus pain, sore throat, trouble swallowing and voice change.    Eyes: Negative for pain, discharge and visual disturbance.   Respiratory: Negative for apnea, cough, chest tightness and shortness of breath.    Cardiovascular: Negative for chest pain and palpitations.   Gastrointestinal: Negative for abdominal pain, blood in stool, constipation and nausea.   Endocrine: Negative for cold intolerance, polydipsia and polyuria.   Genitourinary: Negative for difficulty urinating, enuresis and flank pain.   Musculoskeletal: Positive for arthralgias, back pain and gait problem.   Skin: Negative for color change.   Allergic/Immunologic: Negative for environmental allergies and immunocompromised state.   Neurological: Negative for dizziness and light-headedness.   Hematological: Negative for adenopathy.   Psychiatric/Behavioral: Negative for agitation, behavioral problems, decreased concentration and dysphoric mood. The patient is not nervous/anxious.    All other systems reviewed and are negative.        Reviewed family, medical, surgical, and social history.    Objective:      BP (!) 162/87 (BP Location: Left arm, Patient Position: Sitting, BP Method: Large (Automatic))   Pulse (!) 56   Resp 18   Ht 5' 3" (1.6 m)   Wt 116.1 kg (256 lb)   SpO2 98%   BMI 45.35 kg/m²   Physical Exam   Constitutional: She is oriented to person, place, and time. She appears well-developed and well-nourished. No distress. "   HENT:   Head: Normocephalic and atraumatic.   Nose: Nose normal.   Mouth/Throat: Oropharynx is clear and moist. No oropharyngeal exudate.   Eyes: Conjunctivae and EOM are normal. Pupils are equal, round, and reactive to light. No scleral icterus.   Neck: Normal range of motion. Neck supple. No thyromegaly present.   Cardiovascular: Normal rate, regular rhythm and normal heart sounds.  Exam reveals no gallop and no friction rub.    No murmur heard.  Pulmonary/Chest: Effort normal and breath sounds normal. No respiratory distress. She has no wheezes. She has no rales. She exhibits no tenderness.   Abdominal: Soft. Bowel sounds are normal. She exhibits no distension. There is no tenderness. There is no guarding.   Musculoskeletal: Normal range of motion. She exhibits tenderness and deformity. She exhibits no edema.   Lymphadenopathy:     She has no cervical adenopathy.   Neurological: She is alert and oriented to person, place, and time. She displays normal reflexes. No cranial nerve deficit or sensory deficit. She exhibits normal muscle tone.   Skin: Skin is warm and dry. No rash noted. She is not diaphoretic. No erythema. No pallor.   Psychiatric: She has a normal mood and affect. Her behavior is normal. Judgment and thought content normal.   Nursing note and vitals reviewed.      Assessment:       1. Primary osteoarthritis of left hip    2. Chronic nonintractable headache, unspecified headache type    3. Chronic fatigue    4. Severe allergic reaction, sequela        Plan:       Primary osteoarthritis of left hip  -     Ambulatory referral to Orthopedics    Chronic nonintractable headache, unspecified headache type    Chronic fatigue  -     TSH; Future; Expected date: 07/02/2018    Severe allergic reaction, sequela    Other orders  -     cetirizine (ZYRTEC) 10 MG tablet; Take 1 tablet (10 mg total) by mouth daily as needed for Allergies.  Dispense: 90 tablet; Refill: 3  -     ondansetron (ZOFRAN) 4 MG tablet; Take  2 tablets (8 mg total) by mouth 2 (two) times daily.  Dispense: 180 tablet; Refill: 3            Risks, benefits, and side effects were discussed with the patient. All questions were answered to the fullest satisfaction of the patient, and pt verbalized understanding and agreement to treatment plan. Pt was to call with any new or worsening symptoms, or present to the ER.

## 2018-07-03 ENCOUNTER — TELEPHONE (OUTPATIENT)
Dept: FAMILY MEDICINE | Facility: CLINIC | Age: 63
End: 2018-07-03

## 2018-07-03 NOTE — TELEPHONE ENCOUNTER
----- Message from Lavell Moreno MD sent at 7/3/2018  7:58 AM CDT -----  Please let this lady know that her thyroid level was normal!

## 2018-07-05 ENCOUNTER — TELEPHONE (OUTPATIENT)
Dept: FAMILY MEDICINE | Facility: CLINIC | Age: 63
End: 2018-07-05

## 2018-07-05 RX ORDER — LEVOCETIRIZINE DIHYDROCHLORIDE 5 MG/1
TABLET, FILM COATED ORAL
Qty: 90 TABLET | Refills: 3 | Status: SHIPPED | OUTPATIENT
Start: 2018-07-05 | End: 2019-01-02

## 2018-07-05 NOTE — TELEPHONE ENCOUNTER
LESLIEM for pt to please call and let us know what her insurance company said about the cost when she called as she was leaving the office at last visit.  [place]

## 2018-07-05 NOTE — TELEPHONE ENCOUNTER
----- Message from Ofelia Hu sent at 7/5/2018 12:11 PM CDT -----  Contact: Self  Patient states she needs a prescription of Epinephrine sent to the pharmacy       [facility] [place] - [address]  [place] [address]  Phone: [phone] Fax: [phone]    Please call if any questions [phone] (home)

## 2018-07-18 DIAGNOSIS — M25.552 LEFT HIP PAIN: Primary | ICD-10-CM

## 2018-07-20 ENCOUNTER — HOSPITAL ENCOUNTER (OUTPATIENT)
Dept: RADIOLOGY | Facility: HOSPITAL | Age: 63
Discharge: HOME | End: 2018-07-20
Attending: ORTHOPAEDIC SURGERY
Payer: MEDICARE

## 2018-07-20 ENCOUNTER — OFFICE VISIT (OUTPATIENT)
Dept: ORTHOPEDICS | Facility: CLINIC | Age: 63
End: 2018-07-20
Payer: MEDICARE

## 2018-07-20 VITALS
BODY MASS INDEX: 45.36 KG/M2 | SYSTOLIC BLOOD PRESSURE: 141 MMHG | DIASTOLIC BLOOD PRESSURE: 69 MMHG | HEIGHT: 63 IN | WEIGHT: 256 LBS | HEART RATE: 60 BPM

## 2018-07-20 DIAGNOSIS — M70.62 GREATER TROCHANTERIC BURSITIS OF LEFT HIP: ICD-10-CM

## 2018-07-20 DIAGNOSIS — M54.10 RADICULAR PAIN OF LEFT LOWER EXTREMITY: ICD-10-CM

## 2018-07-20 DIAGNOSIS — M25.552 LEFT HIP PAIN: ICD-10-CM

## 2018-07-20 DIAGNOSIS — M87.052 ASEPTIC NECROSIS OF BONE OF LEFT HIP: ICD-10-CM

## 2018-07-20 PROCEDURE — 99213 OFFICE O/P EST LOW 20 MIN: CPT | Mod: 25,PBBFAC | Performed by: ORTHOPAEDIC SURGERY

## 2018-07-20 PROCEDURE — 73502 X-RAY EXAM HIP UNI 2-3 VIEWS: CPT | Mod: TC,FY

## 2018-07-20 PROCEDURE — 73502 X-RAY EXAM HIP UNI 2-3 VIEWS: CPT | Mod: 26,,, | Performed by: RADIOLOGY

## 2018-07-20 PROCEDURE — 99203 OFFICE O/P NEW LOW 30 MIN: CPT | Mod: S$PBB,,, | Performed by: ORTHOPAEDIC SURGERY

## 2018-07-20 PROCEDURE — 99999 PR PBB SHADOW E&M-EST. PATIENT-LVL III: CPT | Mod: 25,PBBFAC,, | Performed by: ORTHOPAEDIC SURGERY

## 2018-07-20 NOTE — LETTER
July 20, 2018      Lavlel Moreno MD  [address] [place] [address]           [facility]  [address] [place] [address]  Phone: [phone]  Fax: [phone]          Patient: Alida Chacon   MR Number: [id number]   YOB: 1955   Date of Visit: 7/20/2018       Dear Dr. Lavell Moreno:    Thank you for referring Alida Chacon to me for evaluation. Attached you will find relevant portions of my assessment and plan of care.    If you have questions, please do not hesitate to call me. I look forward to following Alida Chacon along with you.    Sincerely,    Brandon Correia, DO    Enclosure  CC:  No Recipients    If you would like to receive this communication electronically, please contact [email or web address] or [phone] to request more information on [employer] Link access.    For providers and/or their staff who would like to refer a patient to [facility], please contact us through our one-stop-shop provider referral line, [facility] , at [phone].    If you feel you have received this communication in error or would no longer like to receive these types of communications, please e-mail [email or web address]

## 2018-08-14 ENCOUNTER — TELEPHONE (OUTPATIENT)
Dept: ORTHOPEDICS | Facility: CLINIC | Age: 63
End: 2018-08-14

## 2018-08-14 NOTE — TELEPHONE ENCOUNTER
----- Message from Aria Laboy sent at 8/14/2018 11:56 AM CDT -----  Contact: self  [phone]  Pt is calling to schedule an appt for left hip and back.  Dr Street has a referral in system      Thank you!

## 2018-09-18 ENCOUNTER — TELEPHONE (OUTPATIENT)
Dept: SPINE | Facility: CLINIC | Age: 63
End: 2018-09-18

## 2018-09-18 DIAGNOSIS — M51.36 DDD (DEGENERATIVE DISC DISEASE), LUMBAR: Primary | ICD-10-CM

## 2018-09-25 ENCOUNTER — TELEPHONE (OUTPATIENT)
Dept: FAMILY MEDICINE | Facility: CLINIC | Age: 63
End: 2018-09-25

## 2018-09-25 NOTE — TELEPHONE ENCOUNTER
----- Message from Leydi Cardenas sent at 9/25/2018 12:05 PM CDT -----  Contact: self  Verifying paperwork is ready to be picked up. Please call back at [phone] (babu)

## 2018-09-26 ENCOUNTER — OFFICE VISIT (OUTPATIENT)
Dept: ORTHOPEDICS | Facility: CLINIC | Age: 63
End: 2018-09-26
Payer: MEDICARE

## 2018-09-26 ENCOUNTER — HOSPITAL ENCOUNTER (OUTPATIENT)
Dept: RADIOLOGY | Facility: HOSPITAL | Age: 63
Discharge: HOME | End: 2018-09-26
Attending: ORTHOPAEDIC SURGERY
Payer: MEDICARE

## 2018-09-26 VITALS
TEMPERATURE: 99 F | BODY MASS INDEX: 45.68 KG/M2 | HEIGHT: 63 IN | SYSTOLIC BLOOD PRESSURE: 141 MMHG | HEART RATE: 65 BPM | WEIGHT: 257.81 LBS | DIASTOLIC BLOOD PRESSURE: 74 MMHG

## 2018-09-26 DIAGNOSIS — M54.16 LUMBAR RADICULOPATHY: ICD-10-CM

## 2018-09-26 DIAGNOSIS — M25.552 LEFT HIP PAIN: Primary | ICD-10-CM

## 2018-09-26 DIAGNOSIS — M51.36 DDD (DEGENERATIVE DISC DISEASE), LUMBAR: ICD-10-CM

## 2018-09-26 PROCEDURE — 72100 X-RAY EXAM L-S SPINE 2/3 VWS: CPT | Mod: 26,,, | Performed by: RADIOLOGY

## 2018-09-26 PROCEDURE — 99214 OFFICE O/P EST MOD 30 MIN: CPT | Mod: S$PBB,,, | Performed by: PHYSICIAN ASSISTANT

## 2018-09-26 PROCEDURE — 99999 PR PBB SHADOW E&M-EST. PATIENT-LVL V: CPT | Mod: PBBFAC,,, | Performed by: PHYSICIAN ASSISTANT

## 2018-09-26 PROCEDURE — 72100 X-RAY EXAM L-S SPINE 2/3 VWS: CPT | Mod: TC

## 2018-09-26 PROCEDURE — 72120 X-RAY BEND ONLY L-S SPINE: CPT | Mod: 26,,, | Performed by: RADIOLOGY

## 2018-09-26 PROCEDURE — 99215 OFFICE O/P EST HI 40 MIN: CPT | Mod: PBBFAC,25 | Performed by: PHYSICIAN ASSISTANT

## 2018-09-26 RX ORDER — CALCIUM/MAGNESIUM 300-300 MG
TABLET ORAL
COMMUNITY

## 2018-09-26 NOTE — LETTER
September 27, 2018      Brandon Correia, DO  [address]  [facility] [place] [address]           [facility]  [address]  [place] [address]  Phone: [phone]          Patient: Alida Chacon   MR Number: [id number]   YOB: 1955   Date of Visit: 9/26/2018       Dear Dr. Brandon Correia:    Thank you for referring Alida Chacon to me for evaluation. Attached you will find relevant portions of my assessment and plan of care.    If you have questions, please do not hesitate to call me. I look forward to following Alida Chacon along with you.    Sincerely,    Hilary Carballo PA-C    Enclosure  CC:  No Recipients    If you would like to receive this communication electronically, please contact [email or web address] or [phone] to request more information on [employer] Link access.    For providers and/or their staff who would like to refer a patient to [facility], please contact us through our one-stop-shop provider referral line, [facility], at [phone].    If you feel you have received this communication in error or would no longer like to receive these types of communications, please e-mail [email or web address]

## 2018-09-27 ENCOUNTER — PATIENT MESSAGE (OUTPATIENT)
Dept: FAMILY MEDICINE | Facility: CLINIC | Age: 63
End: 2018-09-27

## 2018-09-27 NOTE — PROGRESS NOTES
DATE: 9/27/2018  PATIENT: Alida Chacon    Supervising Physician: Juancarlos Walter M.D.    CHIEF COMPLAINT: low back and left leg pain    HISTORY:  Alida Chacon is a 62 y.o. female with PMH of kidney transplant here for initial evaluation of low back and left leg and left hip pain (Back - 10, Leg - 10 , Hip -10).  The pain has been present since 1990 but has progressively worsened over the last year. The patient describes the pain as aching and sharp.  The pain is worse with standing and walking and improved by sitting and leaning forward. The pain is worse with any weightbearing on the left leg.  There is pain in the left hip with any weightbearing as well.  There is associated numbness and tingling. There is subjective weakness.  She ambulates with a cane.  Prior treatments have included pain medications prescribed by pain management, physical therapy, ESIs and hip joint injections a couple years ago, but no surgery.  She says she had an allergic reaction to the injections and cannot have injections anymore.  She was seen by Dr. Correia 7/20 who told her she had AVN of the left hip and needed a hip replacement.     The patient denies myelopathic symptoms such as handwriting changes or difficulty with buttons/coins/keys. Denies perineal paresthesias, bowel/bladder dysfunction.    PAST MEDICAL/SURGICAL HISTORY:  Past Medical History:   Diagnosis Date    Arthritis     djd, problem lying flat    Cataract     OS    Chronic pancreatitis     Encounter for blood transfusion     Hypertension     Lupus     Medication intolerance     taking prilosec for the prevention after transplant    Seasonal allergies     Sleep apnea     Stroke 2005    Thyroid disease     parathyroid disease     Past Surgical History:   Procedure Laterality Date    BREAST SURGERY      biopsy x 3    CATARACT EXTRACTION Right 01/18/2017    sn60wf 19.0D.//    CHOLECYSTECTOMY      dialysis graft      all nonfuctional    HERNIA  REPAIR      hiatal hernia repair    KIDNEY TRANSPLANT Right     phaco/pciol Right 1/18/2017    Per[facility]    STOMACH SURGERY      ulcer repair       Medications:   Current Outpatient Medications on File Prior to Visit   Medication Sig Dispense Refill    alendronate (FOSAMAX) 70 MG tablet TK 1 T PO ONCE WEEKLY  9    calcium carbonate (OS-JACQUELINE) 600 mg (1,500 mg) Tab Take 600 mg by mouth 2 (two) times daily with meals.      calcium-magnesium 300-300 mg Tab Calcium Magnesium      cetirizine (ZYRTEC) 10 MG tablet Take 1 tablet (10 mg total) by mouth daily as needed for Allergies. 90 tablet 3    CIPRODEX 0.3-0.1 % DrpS       ciprofloxacin-dexamethasone 0.3-0.1% (CIPRODEX) 0.3-0.1 % DrpS Ciprodex 0.3 %-0.1 % ear drops,suspension   INSTILL 4 DROPS INTO AFFECTED EAR(S) BY OTIC ROUTE 2 TIMES PER DAY FOR 7 DAYS      diazePAM (VALIUM) 5 MG tablet TK 1 T PO Q 8 H PRN  5    docusate sodium (COLACE) 100 MG capsule Take 100 mg by mouth 2 (two) times daily.      ergocalciferol (ERGOCALCIFEROL) 50,000 unit Cap TK 1 C PO  Q WEEKLY  9    ethacrynic acid (EDECRIN) 25 mg Tab Take 25 mg by mouth 2 (two) times daily.      irbesartan (AVAPRO) 150 MG tablet Take 1 tablet (150 mg total) by mouth once daily. 90 tablet 3    levocetirizine (XYZAL) 5 MG tablet TAKE 1 TABLET BY MOUTH ONCE DAILY FOR ALLERGIES 90 tablet 3    LINZESS 290 mcg Cap       LORazepam (ATIVAN) 0.5 MG tablet lorazepam 0.5 mg tablet      magnesium oxide (MAG-OX) 400 mg tablet Take 400 mg by mouth once daily.      naloxone (NARCAN) 4 mg/actuation Spry Narcan 4 mg/actuation nasal spray   Take by nasal route.      omeprazole (PRILOSEC) 40 MG capsule TK 1 C PO  QD  9    ondansetron (ZOFRAN) 4 MG tablet Take 2 tablets (8 mg total) by mouth 2 (two) times daily. 180 tablet 3    oxycodone (ROXICODONE) 10 mg Tab immediate release tablet       oxycodone (ROXICODONE) 15 MG Tab TK 1 T PO QID PRN  0    OXYCONTIN 20 mg 12 hr  tablet TK 1 T PO Q 12 H  0    OXYGEN-AIR DELIVERY SYSTEMS MISC 2 L by Nasal route nightly as needed.      predniSONE (DELTASONE) 5 MG tablet Take 1 tablet (5 mg total) by mouth once daily. 90 tablet 3    promethazine (PHENERGAN) 25 MG tablet Take 25 mg by mouth every 4 (four) hours as needed for Nausea.      triazolam (HALCION) 0.25 MG Tab Take 0.125 mg by mouth nightly as needed.      warfarin (COUMADIN) 5 MG tablet       warfarin (COUMADIN) 7.5 MG tablet TK 1 T PO QD AT 5 PM  0    CREON 24,000-76,000 -120,000 unit capsule Take 2 capsules by mouth three times daily with food. 540 capsule 3    tacrolimus (PROGRAF) 1 MG Cap Take 3 capsules (3 mg total) by mouth every 12 (twelve) hours. 30 capsule 0    [DISCONTINUED] epinephrine (EPIPEN JR) 0.15 mg/0.3 mL pen injection Inject 0.15 mg into the muscle as needed for Anaphylaxis.       No current facility-administered medications on file prior to visit.        Social History:   Social History     Socioeconomic History    Marital status:      Spouse name: Not on file    Number of children: Not on file    Years of education: Not on file    Highest education level: Not on file   Social Needs    Financial resource strain: Not on file    Food insecurity - worry: Not on file    Food insecurity - inability: Not on file    Transportation needs - medical: Not on file    Transportation needs - non-medical: Not on file   Occupational History    Not on file   Tobacco Use    Smoking status: Never Smoker    Smokeless tobacco: Never Used   Substance and Sexual Activity    Alcohol use: No    Drug use: No    Sexual activity: Not on file   Other Topics Concern    Not on file   Social History Narrative    Not on file       REVIEW OF SYSTEMS:  Constitution: Negative. Negative for chills, fever and night sweats.   Cardiovascular: Negative for chest pain and syncope.   Respiratory: Negative for cough and shortness of breath.   Gastrointestinal: See HPI.  "Negative for nausea/vomiting. Negative for abdominal pain.  Genitourinary: See HPI. Negative for discoloration or dysuria.  Skin: Negative for dry skin, itching and rash.   Hematologic/Lymphatic: Negative for bleeding problem. Does not bruise/bleed easily.   Musculoskeletal: Negative for falls and muscle weakness.   Neurological: See HPI. No seizures.   Endocrine: Negative for polydipsia, polyphagia and polyuria.   Allergic/Immunologic: Negative for hives and persistent infections.     EXAM:  BP (!) 141/74   Pulse 65   Temp 99.2 °F (37.3 °C)   Ht 5' 3" (1.6 m)   Wt 117 kg (257 lb 13.3 oz)   BMI 45.67 kg/m²     General: The patient is a pleasant 62 y.o. female in no apparent distress, the patient is oriented to person, place and time.  Psych: Normal mood and affect  HEENT: Vision grossly intact, hearing intact to the spoken word.  Lungs: Respirations unlabored.  Gait: Antalgic station and gait, unable to perform toe or heel walk. She ambulates with a cane.  Skin: Dorsal lumbar skin negative for rashes, lesions, hairy patches and surgical scars. There is mild lumbar tenderness to palpation.  Range of motion: Lumbar range of motion is acceptable.  Spinal Balance: Global saggital and coronal spinal balance acceptable, not significant for scoliosis and kyphosis.  Musculoskeletal: There is pain in the hip with the range of motion of the left hip. Mild left trochanteric tenderness to palpation.  Vascular: Bilateral lower extremities warm and well perfused, dorsalis pedis pulses 2+ bilaterally.  Neurological: Normal strength and tone in all major motor groups in the bilateral lower extremities. Normal sensation to light touch in the L2-S1 dermatomes bilaterally.  Deep tendon reflexes symmetric 2+ in the bilateral lower extremities.  Negative Babinski bilaterally. Straight leg raise positive bilaterally, reproduces back pain.    IMAGING:      Today I personally reviewed AP, Lat and Flex/Ex  upright L-spine films that " demonstrate grade I anterolisthesis of L4/5.       Body mass index is 45.67 kg/m².    No results found for: HGBA1C        ASSESSMENT/PLAN:    Alida was seen today for pain and pain.    Diagnoses and all orders for this visit:    Left hip pain  -     MRI Lumbar Spine Without Contrast; Future  -     MRI Pelvis Without Contrast; Future    Lumbar radiculopathy  -     MRI Lumbar Spine Without Contrast; Future  -     MRI Pelvis Without Contrast; Future        Discussed with Dr. Walter.  Will get an MRI pelvis and MRI lumbar spine.  Follow up after the MRIs to discuss results and further treatment.  The patient requires sedation for the MRIs as she cannot lay flat without pain. She has tried with oral sedation and was unable to complete the MRI, she needs IV sedation.    Follow-up if symptoms worsen or fail to improve.

## 2018-10-02 ENCOUNTER — PATIENT MESSAGE (OUTPATIENT)
Dept: ORTHOPEDICS | Facility: CLINIC | Age: 63
End: 2018-10-02

## 2018-10-02 ENCOUNTER — OFFICE VISIT (OUTPATIENT)
Dept: FAMILY MEDICINE | Facility: CLINIC | Age: 63
End: 2018-10-02
Payer: MEDICARE

## 2018-10-02 ENCOUNTER — TELEPHONE (OUTPATIENT)
Dept: ORTHOPEDICS | Facility: CLINIC | Age: 63
End: 2018-10-02

## 2018-10-02 ENCOUNTER — PATIENT MESSAGE (OUTPATIENT)
Dept: FAMILY MEDICINE | Facility: CLINIC | Age: 63
End: 2018-10-02

## 2018-10-02 VITALS
HEART RATE: 62 BPM | DIASTOLIC BLOOD PRESSURE: 75 MMHG | WEIGHT: 253 LBS | TEMPERATURE: 98 F | SYSTOLIC BLOOD PRESSURE: 138 MMHG | HEIGHT: 63 IN | OXYGEN SATURATION: 93 % | BODY MASS INDEX: 44.83 KG/M2 | RESPIRATION RATE: 20 BRPM

## 2018-10-02 DIAGNOSIS — M79.89 SOFT TISSUE MASS: Primary | ICD-10-CM

## 2018-10-02 DIAGNOSIS — G47.00 INSOMNIA, UNSPECIFIED TYPE: Primary | ICD-10-CM

## 2018-10-02 PROCEDURE — 99213 OFFICE O/P EST LOW 20 MIN: CPT | Mod: PBBFAC,PN | Performed by: FAMILY MEDICINE

## 2018-10-02 PROCEDURE — 99999 PR PBB SHADOW E&M-EST. PATIENT-LVL III: CPT | Mod: PBBFAC,,, | Performed by: FAMILY MEDICINE

## 2018-10-02 PROCEDURE — 99213 OFFICE O/P EST LOW 20 MIN: CPT | Mod: S$PBB,,, | Performed by: FAMILY MEDICINE

## 2018-10-02 RX ORDER — TRIAZOLAM 0.25 MG/1
0.25 TABLET ORAL NIGHTLY PRN
Qty: 30 TABLET | Refills: 2 | Status: SHIPPED | OUTPATIENT
Start: 2018-10-02 | End: 2019-04-02

## 2018-10-02 RX ORDER — TRIAZOLAM 0.25 MG/1
0.12 TABLET ORAL NIGHTLY PRN
Status: CANCELLED | OUTPATIENT
Start: 2018-10-02

## 2018-10-02 NOTE — TELEPHONE ENCOUNTER
----- Message from Naty Roca sent at 10/2/2018  3:28 PM CDT -----  Contact: self   Pt is requesting a call back to schedule MRI. Pt stated she was told it was not set up for sedation. Pt can be reached at [phone].

## 2018-10-02 NOTE — PROGRESS NOTES
"Pt called by RN and informed of plan of care and medication for MRI / pt reports she needs to be "put out completely" pt reports violent reactions and "punched out anesthesiologist" pt stated she talked with her PCP - PA and this was discussed / pt advised to contact PCP and explain what she told me and have MRI re-scheduled     Pt reports Lisa used propofol and that's what she is requesting   "

## 2018-10-02 NOTE — PROGRESS NOTES
"Subjective:       Patient ID: Alida Chacon is a 62 y.o. female.    Chief Complaint: Follow-up    Follow up    Insomnia well controlled  Requests refill on medicine      Review of Systems   Constitutional: Positive for fatigue. Negative for activity change, appetite change, chills and fever.   HENT: Negative for congestion, dental problem, facial swelling, nosebleeds, postnasal drip, sinus pain, sore throat, trouble swallowing and voice change.    Eyes: Negative for pain, discharge and visual disturbance.   Respiratory: Negative for apnea, cough, chest tightness and shortness of breath.    Cardiovascular: Negative for chest pain and palpitations.   Gastrointestinal: Negative for abdominal pain, blood in stool, constipation and nausea.   Endocrine: Negative for cold intolerance, polydipsia and polyuria.   Genitourinary: Negative for difficulty urinating, enuresis and flank pain.   Musculoskeletal: Positive for arthralgias, back pain and gait problem.   Skin: Negative for color change.   Allergic/Immunologic: Negative for environmental allergies and immunocompromised state.   Neurological: Negative for dizziness and light-headedness.   Hematological: Negative for adenopathy.   Psychiatric/Behavioral: Negative for agitation, behavioral problems, decreased concentration and dysphoric mood. The patient is not nervous/anxious.    All other systems reviewed and are negative.        Reviewed family, medical, surgical, and social history.    Objective:      /75 (BP Location: Right arm, Patient Position: Sitting)   Pulse 62   Temp 98 °F (36.7 °C)   Resp 20   Ht 5' 3" (1.6 m)   Wt 114.8 kg (253 lb)   SpO2 (!) 93%   BMI 44.82 kg/m²   Physical Exam   Constitutional: She is oriented to person, place, and time. She appears well-developed and well-nourished. No distress.   HENT:   Head: Normocephalic and atraumatic.   Nose: Nose normal.   Mouth/Throat: Oropharynx is clear and moist. No oropharyngeal exudate. "   Eyes: Conjunctivae and EOM are normal. Pupils are equal, round, and reactive to light. No scleral icterus.   Neck: Normal range of motion. Neck supple. No thyromegaly present.   Cardiovascular: Normal rate, regular rhythm and normal heart sounds. Exam reveals no gallop and no friction rub.   No murmur heard.  Pulmonary/Chest: Effort normal and breath sounds normal. No respiratory distress. She has no wheezes. She has no rales. She exhibits no tenderness.   Abdominal: Soft. Bowel sounds are normal. She exhibits no distension. There is no tenderness. There is no guarding.   Musculoskeletal: Normal range of motion. She exhibits tenderness and deformity. She exhibits no edema.   Lymphadenopathy:     She has no cervical adenopathy.   Neurological: She is alert and oriented to person, place, and time. She displays normal reflexes. No cranial nerve deficit or sensory deficit. She exhibits normal muscle tone.   Skin: Skin is warm and dry. No rash noted. She is not diaphoretic. No erythema. No pallor.   Psychiatric: She has a normal mood and affect. Her behavior is normal. Judgment and thought content normal.   Nursing note and vitals reviewed.      Assessment:       1. Insomnia, unspecified type        Plan:       Insomnia, unspecified type  -     triazolam (HALCION) 0.25 MG Tab; Take 1 tablet (0.25 mg total) by mouth nightly as needed.  Dispense: 30 tablet; Refill: 2    Other orders  -     Cancel: triazolam (HALCION) 0.25 MG Tab; Take 1 tablet (0.25 mg total) by mouth nightly as needed.            Risks, benefits, and side effects were discussed with the patient. All questions were answered to the fullest satisfaction of the patient, and pt verbalized understanding and agreement to treatment plan. Pt was to call with any new or worsening symptoms, or present to the ER.

## 2018-10-03 ENCOUNTER — HOSPITAL ENCOUNTER (OUTPATIENT)
Dept: RADIOLOGY | Facility: HOSPITAL | Age: 63
Discharge: HOME | End: 2018-10-03
Attending: PHYSICIAN ASSISTANT

## 2018-10-03 NOTE — TELEPHONE ENCOUNTER
Patient needs MRI to be done under sedation with propofol.     Please call radiology and ask if I order this as MRI with sedation or if it has to be ordered as MRI under general anesthesia?     If it has to be MRI under general anesthesia, then who do we need to call to schedule this?

## 2018-10-10 ENCOUNTER — TELEPHONE (OUTPATIENT)
Dept: FAMILY MEDICINE | Facility: CLINIC | Age: 63
End: 2018-10-10

## 2018-10-10 DIAGNOSIS — M25.552 LEFT HIP PAIN: ICD-10-CM

## 2018-10-10 DIAGNOSIS — M54.16 LUMBAR RADICULOPATHY: Primary | ICD-10-CM

## 2018-10-10 NOTE — TELEPHONE ENCOUNTER
LESLIEM for pt that I was calling to schedule her ultrasound, along with the telephone number for scheduling [phone]) so that she may call to schedule it.  [place]

## 2018-10-12 ENCOUNTER — HOSPITAL ENCOUNTER (OUTPATIENT)
Dept: RADIOLOGY | Facility: HOSPITAL | Age: 63
Discharge: HOME | End: 2018-10-12
Attending: FAMILY MEDICINE
Payer: MEDICARE

## 2018-10-12 DIAGNOSIS — M79.89 SOFT TISSUE MASS: ICD-10-CM

## 2018-10-12 PROCEDURE — 76604 US EXAM CHEST: CPT | Mod: 26,52,, | Performed by: RADIOLOGY

## 2018-10-12 PROCEDURE — 76999 ECHO EXAMINATION PROCEDURE: CPT | Mod: TC

## 2018-10-15 ENCOUNTER — TELEPHONE (OUTPATIENT)
Dept: FAMILY MEDICINE | Facility: CLINIC | Age: 63
End: 2018-10-15

## 2018-10-15 NOTE — TELEPHONE ENCOUNTER
Notified pt that the US if her breast came back as lipoma, and that she will only need it removed if it causes her pain. Pt voiced understanding, no other concerns at this time.     ----- Message from Aria Laboy sent at 10/15/2018  1:43 PM CDT -----  Contact: [phone]  Pt missed a call and would like the nurse to return their call.        Thank you!

## 2018-10-15 NOTE — TELEPHONE ENCOUNTER
LVM for pt to return call r/t lab results.       ----- Message from Lavell Moreno MD sent at 10/12/2018  5:21 PM CDT -----  Would you let this lady know that her ultrasound showed that she has lipoma (benign fat collection), and only needs to be removed if she is still having pain.

## 2018-10-28 ENCOUNTER — PATIENT MESSAGE (OUTPATIENT)
Dept: ORTHOPEDICS | Facility: CLINIC | Age: 63
End: 2018-10-28

## 2018-10-29 ENCOUNTER — PATIENT MESSAGE (OUTPATIENT)
Dept: SPINE | Facility: CLINIC | Age: 63
End: 2018-10-29

## 2018-10-29 ENCOUNTER — PATIENT MESSAGE (OUTPATIENT)
Dept: ORTHOPEDICS | Facility: CLINIC | Age: 63
End: 2018-10-29

## 2018-10-30 ENCOUNTER — PATIENT MESSAGE (OUTPATIENT)
Dept: SPINE | Facility: CLINIC | Age: 63
End: 2018-10-30

## 2018-10-30 ENCOUNTER — TELEPHONE (OUTPATIENT)
Dept: SPINE | Facility: CLINIC | Age: 63
End: 2018-10-30

## 2018-10-30 DIAGNOSIS — M51.36 DDD (DEGENERATIVE DISC DISEASE), LUMBAR: Primary | ICD-10-CM

## 2018-11-05 ENCOUNTER — ANESTHESIA EVENT (OUTPATIENT)
Dept: ENDOSCOPY | Facility: HOSPITAL | Age: 63
End: 2018-11-05
Payer: MEDICARE

## 2018-11-05 NOTE — ANESTHESIA PREPROCEDURE EVALUATION
11/05/2018  Alida Chacon is a 63 y.o., female.  Pre-operative evaluation for Procedure(s) (LRB):  MRI (Magnetic Resonance Imagine) (N/A)    Alida Chacon is a 63 y.o. 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    Greater trochanteric bursitis of left hip    Radicular pain of left lower extremity       Review of patient's allergies indicates:   Allergen Reactions    Adhesive tape-silicones Blisters     TEGADERM IS OKAY    Bee venom protein (honey bee) Anaphylaxis    Cat/feline products Shortness Of Breath    Cytoxan [cyclophosphamide] Anaphylaxis    Penicillins Anaphylaxis    Sulfa (sulfonamide antibiotics) Anaphylaxis    Tetracyclines Anaphylaxis    Compazine [prochlorperazine edisylate] Hives    Lasix [furosemide] Hives    Morphine Other (See Comments)     Pt reports severe headache    Versed [midazolam] Other (See Comments)     agitation    Vicodin [hydrocodone-acetaminophen] Other (See Comments)     Pt reports having severe headaches     Cortisone      Angioedema with an injectible    Erythromycin Hives, Itching and Swelling    Erythromycin base     Preservatives [preservative]      Angioedema       No current facility-administered medications on file prior to encounter.      Current Outpatient Medications on File Prior to Encounter   Medication Sig Dispense Refill    calcium carbonate (OS-JACQUELINE) 600 mg (1,500 mg) Tab Take 600 mg by mouth 2 (two) times daily with meals.      calcium-magnesium 300-300 mg Tab Calcium Magnesium      cetirizine (ZYRTEC) 10 MG tablet Take 1 tablet (10 mg total) by mouth daily as needed for Allergies. 90 tablet 3    CREON 24,000-76,000 -120,000 unit capsule Take 2 capsules by mouth three times daily with food. 540 capsule 3    docusate sodium (COLACE) 100 MG capsule Take 100 mg  by mouth 2 (two) times daily.      irbesartan (AVAPRO) 150 MG tablet Take 1 tablet (150 mg total) by mouth once daily. 90 tablet 3    levocetirizine (XYZAL) 5 MG tablet TAKE 1 TABLET BY MOUTH ONCE DAILY FOR ALLERGIES 90 tablet 3    magnesium oxide (MAG-OX) 400 mg tablet Take 400 mg by mouth once daily.      omeprazole (PRILOSEC) 40 MG capsule TK 1 C PO  QD  9    ondansetron (ZOFRAN) 4 MG tablet Take 2 tablets (8 mg total) by mouth 2 (two) times daily. 180 tablet 3    oxycodone (ROXICODONE) 15 MG Tab TK 1 T PO QID PRN  0    OXYGEN-AIR DELIVERY SYSTEMS MISC 2 L by Nasal route nightly as needed.      predniSONE (DELTASONE) 5 MG tablet Take 1 tablet (5 mg total) by mouth once daily. 90 tablet 3    tacrolimus (PROGRAF) 1 MG Cap Take 3 capsules (3 mg total) by mouth every 12 (twelve) hours. 30 capsule 0    triazolam (HALCION) 0.25 MG Tab Take 1 tablet (0.25 mg total) by mouth nightly as needed. 30 tablet 2    warfarin (COUMADIN) 5 MG tablet Take 5 mg by mouth Daily.       alendronate (FOSAMAX) 70 MG tablet TK 1 T PO ONCE WEEKLY  9    CIPRODEX 0.3-0.1 % DrpS       ciprofloxacin-dexamethasone 0.3-0.1% (CIPRODEX) 0.3-0.1 % DrpS Ciprodex 0.3 %-0.1 % ear drops,suspension   INSTILL 4 DROPS INTO AFFECTED EAR(S) BY OTIC ROUTE 2 TIMES PER DAY FOR 7 DAYS      diazePAM (VALIUM) 5 MG tablet TK 1 T PO Q 8 H PRN  5    ergocalciferol (ERGOCALCIFEROL) 50,000 unit Cap TK 1 C PO  Q WEEKLY  9    ethacrynic acid (EDECRIN) 25 mg Tab Take 25 mg by mouth 2 (two) times daily as needed.       LINZESS 290 mcg Cap       LORazepam (ATIVAN) 0.5 MG tablet lorazepam 0.5 mg tablet PRN      naloxone (NARCAN) 4 mg/actuation Spry Narcan 4 mg/actuation nasal spray   Take by nasal route.      [DISCONTINUED] epinephrine (EPIPEN JR) 0.15 mg/0.3 mL pen injection Inject 0.15 mg into the muscle as needed for Anaphylaxis.         Past Surgical History:   Procedure Laterality Date    BREAST SURGERY      biopsy x 3    CATARACT EXTRACTION Right  2017    [id number] 19.0D.//    CHOLECYSTECTOMY      dialysis graft      all nonfuctional    HERNIA REPAIR      hiatal hernia repair    KIDNEY TRANSPLANT Right     phaco/pciol Right 2017    Performed by Steffanie Caraballo MD at [facility] OR    STOMACH SURGERY      ulcer repair       Social History     Socioeconomic History    Marital status:      Spouse name: Not on file    Number of children: Not on file    Years of education: Not on file    Highest education level: Not on file   Social Needs    Financial resource strain: Not on file    Food insecurity - worry: Not on file    Food insecurity - inability: Not on file    Transportation needs - medical: Not on file    Transportation needs - non-medical: Not on file   Occupational History    Not on file   Tobacco Use    Smoking status: Never Smoker    Smokeless tobacco: Never Used   Substance and Sexual Activity    Alcohol use: No    Drug use: No    Sexual activity: Not on file   Other Topics Concern    Not on file   Social History Narrative    Not on file         CBC: No results for input(s): WBC, RBC, HGB, HCT, PLT, MCV, MCH, MCHC in the last 72 hours.    CMP: No results for input(s): NA, K, CL, CO2, BUN, CREATININE, GLU, MG, PHOS, CALCIUM, ALBUMIN, PROT, ALKPHOS, ALT, AST, BILITOT in the last 72 hours.    INR  No results for input(s): PT, INR, PROTIME, APTT in the last 72 hours.        Diagnostic Studies:      EKD Echo:  No results found for this or any previous visit.    Anesthesia Evaluation    I have reviewed the Patient Summary Reports.    I have reviewed the Nursing Notes.   I have reviewed the Medications.     Review of Systems  Anesthesia Hx:  No problems with previous Anesthesia  Denies Family Hx of Anesthesia complications.  Personal Hx of Anesthesia complications, Post-Operative Nausea/Vomiting, with every anesthetic, treatment not known   Cardiovascular:   Hypertension    Pulmonary:   Sleep Apnea     Renal/:   Chronic Renal Disease S/P kidney transplant 2011, function good per patient   Hepatic/GI:   Denies GERD.    Neurological:   CVA        Physical Exam  General:  Morbid Obesity    Airway/Jaw/Neck:  Airway Findings: Mouth Opening: Normal Tongue: Normal  Mallampati: II  TM Distance: Normal, at least 6 cm      Dental:  Dental Findings: In tact   Chest/Lungs:  Chest/Lungs Findings: Clear to auscultation, Normal Respiratory Rate     Heart/Vascular:  Heart Findings: Rate: Normal  Rhythm: Regular Rhythm  Sounds: Normal             Anesthesia Plan  Type of Anesthesia, risks & benefits discussed:  Anesthesia Type:  general  Patient's Preference:   Intra-op Monitoring Plan: standard ASA monitors  Intra-op Monitoring Plan Comments:   Post Op Pain Control Plan:   Post Op Pain Control Plan Comments:   Induction:   IV  Beta Blocker:  Patient is not currently on a Beta-Blocker (No further documentation required).       Informed Consent: Patient understands risks and agrees with Anesthesia plan.  Questions answered. Anesthesia consent signed with patient.  ASA Score: 3     Day of Surgery Review of History & Physical:     H&P completed by Anesthesiologist.       Ready For Surgery From Anesthesia Perspective.    PONV  KIDNEY TX - 5/2011  VERSED ALLERGY   COUMADIN - 5 MG Q PM  VERY DIFFICULT IV STICK  SLE    REPORTS HAVING PREVIOUS MRI WITH PROPOFOL - NO PROBLEMS

## 2018-11-05 NOTE — PRE-PROCEDURE INSTRUCTIONS
PreOp Instructions given:   - Verbal medication information (what to hold and what to take)   - NPO guidelines   - Arrival place directions given;   - Bathing with antibacterial soap   - Don't wear any jewelry or bring any valuables AM of surgery   - No makeup or moisturizer to face   - No perfume/cologne, powder, lotions or aftershave   Pt. verbalized understanding.     PONV  KIDNEY TX - 5/2011  VERSED ALLERGY   COUMADIN - 5 MG Q PM  VERY DIFFICULT IV STICK  SLE    REPORTS HAVING PREVIOUS MRI WITH PROPOFOL - NO PROBLEMS      Detailed instructions on how to get to HOSPITAL MRI : get off on first floor of parking garage elevator. Walk past information desk & coffee shop, down long hallway with art work until you run into a blue sign that says HOSPITAL MRI. Start following signs and arrows at this point. You will end up at a door that says MRI ZONE 1 [facility]. Enter there. Do NOT go across the street or to the [employer] department on the second floor.

## 2018-11-06 ENCOUNTER — HOSPITAL ENCOUNTER (OUTPATIENT)
Dept: RADIOLOGY | Facility: HOSPITAL | Age: 63
Discharge: HOME | End: 2018-11-06
Attending: PHYSICIAN ASSISTANT
Payer: MEDICARE

## 2018-11-06 ENCOUNTER — HOSPITAL ENCOUNTER (OUTPATIENT)
Facility: HOSPITAL | Age: 63
Discharge: HOME | End: 2018-11-06
Attending: ANESTHESIOLOGY | Admitting: ANESTHESIOLOGY
Payer: MEDICARE

## 2018-11-06 ENCOUNTER — ANESTHESIA (OUTPATIENT)
Dept: ENDOSCOPY | Facility: HOSPITAL | Age: 63
End: 2018-11-06
Payer: MEDICARE

## 2018-11-06 VITALS
SYSTOLIC BLOOD PRESSURE: 156 MMHG | HEART RATE: 85 BPM | OXYGEN SATURATION: 98 % | BODY MASS INDEX: 44.83 KG/M2 | RESPIRATION RATE: 18 BRPM | WEIGHT: 253 LBS | HEIGHT: 63 IN | TEMPERATURE: 98 F | DIASTOLIC BLOOD PRESSURE: 87 MMHG

## 2018-11-06 DIAGNOSIS — M25.552 LEFT HIP PAIN: ICD-10-CM

## 2018-11-06 DIAGNOSIS — M54.16 LUMBAR RADICULOPATHY: ICD-10-CM

## 2018-11-06 DIAGNOSIS — M54.9 BACK PAIN: ICD-10-CM

## 2018-11-06 PROCEDURE — 72148 MRI LUMBAR SPINE W/O DYE: CPT | Mod: 26,,, | Performed by: RADIOLOGY

## 2018-11-06 PROCEDURE — 63600175 PHARM REV CODE 636 W HCPCS: Performed by: NURSE ANESTHETIST, CERTIFIED REGISTERED

## 2018-11-06 PROCEDURE — 72195 MRI PELVIS W/O DYE: CPT | Mod: TC

## 2018-11-06 PROCEDURE — 37000009 HC ANESTHESIA EA ADD 15 MINS

## 2018-11-06 PROCEDURE — D9220A PRA ANESTHESIA: Mod: ANES,,, | Performed by: ANESTHESIOLOGY

## 2018-11-06 PROCEDURE — 63600175 PHARM REV CODE 636 W HCPCS: Performed by: ANESTHESIOLOGY

## 2018-11-06 PROCEDURE — D9220A PRA ANESTHESIA: Mod: CRNA,,, | Performed by: NURSE ANESTHETIST, CERTIFIED REGISTERED

## 2018-11-06 PROCEDURE — 71000044 HC DOSC ROUTINE RECOVERY FIRST HOUR

## 2018-11-06 PROCEDURE — 37000008 HC ANESTHESIA 1ST 15 MINUTES

## 2018-11-06 PROCEDURE — 25000003 PHARM REV CODE 250: Performed by: ANESTHESIOLOGY

## 2018-11-06 PROCEDURE — 72195 MRI PELVIS W/O DYE: CPT | Mod: 26,,, | Performed by: RADIOLOGY

## 2018-11-06 PROCEDURE — 72148 MRI LUMBAR SPINE W/O DYE: CPT | Mod: TC

## 2018-11-06 RX ORDER — PROPOFOL 10 MG/ML
VIAL (ML) INTRAVENOUS
Status: DISCONTINUED | OUTPATIENT
Start: 2018-11-06 | End: 2018-11-06

## 2018-11-06 RX ORDER — SODIUM CHLORIDE 9 MG/ML
INJECTION, SOLUTION INTRAVENOUS CONTINUOUS
Status: DISCONTINUED | OUTPATIENT
Start: 2018-11-06 | End: 2018-11-06 | Stop reason: HOSPADM

## 2018-11-06 RX ORDER — PROPOFOL 10 MG/ML
VIAL (ML) INTRAVENOUS CONTINUOUS PRN
Status: DISCONTINUED | OUTPATIENT
Start: 2018-11-06 | End: 2018-11-06

## 2018-11-06 RX ORDER — FENTANYL CITRATE 50 UG/ML
25 INJECTION, SOLUTION INTRAMUSCULAR; INTRAVENOUS EVERY 5 MIN PRN
Status: DISCONTINUED | OUTPATIENT
Start: 2018-11-06 | End: 2018-11-06 | Stop reason: HOSPADM

## 2018-11-06 RX ORDER — LIDOCAINE HCL/PF 100 MG/5ML
SYRINGE (ML) INTRAVENOUS
Status: DISCONTINUED | OUTPATIENT
Start: 2018-11-06 | End: 2018-11-06

## 2018-11-06 RX ORDER — FENTANYL CITRATE 50 UG/ML
INJECTION, SOLUTION INTRAMUSCULAR; INTRAVENOUS
Status: DISCONTINUED | OUTPATIENT
Start: 2018-11-06 | End: 2018-11-06

## 2018-11-06 RX ORDER — LIDOCAINE HYDROCHLORIDE 10 MG/ML
1 INJECTION, SOLUTION EPIDURAL; INFILTRATION; INTRACAUDAL; PERINEURAL ONCE
Status: DISCONTINUED | OUTPATIENT
Start: 2018-11-06 | End: 2018-11-06 | Stop reason: HOSPADM

## 2018-11-06 RX ORDER — METOCLOPRAMIDE HYDROCHLORIDE 5 MG/ML
10 INJECTION INTRAMUSCULAR; INTRAVENOUS EVERY 10 MIN PRN
Status: DISCONTINUED | OUTPATIENT
Start: 2018-11-06 | End: 2018-11-06 | Stop reason: HOSPADM

## 2018-11-06 RX ADMIN — LIDOCAINE HYDROCHLORIDE 50 MG: 20 INJECTION, SOLUTION INTRAVENOUS at 12:11

## 2018-11-06 RX ADMIN — SODIUM CHLORIDE: 0.9 INJECTION, SOLUTION INTRAVENOUS at 11:11

## 2018-11-06 RX ADMIN — PROPOFOL 150 MCG/KG/MIN: 10 INJECTION, EMULSION INTRAVENOUS at 12:11

## 2018-11-06 RX ADMIN — FENTANYL CITRATE 100 MCG: 50 INJECTION, SOLUTION INTRAMUSCULAR; INTRAVENOUS at 12:11

## 2018-11-06 RX ADMIN — FENTANYL CITRATE 25 MCG: 50 INJECTION INTRAMUSCULAR; INTRAVENOUS at 02:11

## 2018-11-06 RX ADMIN — PROPOFOL 30 MG: 10 INJECTION, EMULSION INTRAVENOUS at 12:11

## 2018-11-06 NOTE — TRANSFER OF CARE
"Anesthesia Transfer of Care Note    Patient: Alida Chacon    Procedure(s) Performed: Procedure(s) (LRB):  MRI (Magnetic Resonance Imagine) (N/A)    Patient location: PACU    Anesthesia Type: general    Transport from OR: Transported from OR on room air with adequate spontaneous ventilation    Post pain: adequate analgesia    Post assessment: no apparent anesthetic complications    Post vital signs: stable    Level of consciousness: awake    Nausea/Vomiting: no nausea/vomiting    Complications: none    Transfer of care protocol was followed      Last vitals:   Visit Vitals  BP (!) 188/81 (BP Location: Right arm, Patient Position: Lying)   Pulse 68   Temp 36.7 °C (98.1 °F) (Temporal)   Resp 18   Ht 5' 3" (1.6 m)   Wt 114.8 kg (253 lb)   SpO2 97%   Breastfeeding? No   BMI 44.82 kg/m²     "

## 2018-11-06 NOTE — ANESTHESIA POSTPROCEDURE EVALUATION
"Anesthesia Post Evaluation    Patient: Alida Chacon    Procedure(s) Performed: Procedure(s) (LRB):  MRI (Magnetic Resonance Imagine) (N/A)    [facility] Anesthesia Post Op Evaluation    Visit Vitals  BP (!) 156/87   Pulse 85   Temp 36.6 °C (97.8 °F) (Temporal)   Resp 18   Ht 5' 3" (1.6 m)   Wt 114.8 kg (253 lb)   SpO2 98%   Breastfeeding? No   BMI 44.82 kg/m²       Pain/Surya Score: Pain Assessment Performed: Yes (11/6/2018  2:55 PM)  Presence of Pain: denies (11/6/2018  2:55 PM)  Pain Rating Prior to Med Admin: 10 (11/6/2018  2:55 PM)  Pain Rating Post Med Admin: 0 (11/6/2018  2:55 PM)  Surya Score: 10 (11/6/2018  2:17 PM)      Anesthesia Discharge Summary    Admit Date: 11/6/2018    Discharge Date and Time: 11/6/2018  3:03 PM    Attending Physician:  No att. 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 Test performed without complication.    Consults: none    Significant Diagnostic Studies: None    Treatments/Procedures: Procedure(s) (LRB): anesthesia for exam    Disposition: Home or Self Care    Patient Instructions:   Discharge Medication List as of 11/6/2018  2:32 PM      CONTINUE these medications which have NOT CHANGED    Details   alendronate (FOSAMAX) 70 MG tablet TK 1 T PO ONCE WEEKLY, Historical Med      calcium carbonate (OS-JACQUELINE) 600 mg (1,500 mg) Tab Take 600 mg by mouth 2 (two) times daily with meals., Until Discontinued, Historical Med      calcium-magnesium 300-300 mg Tab Calcium Magnesium, Historical Med      cetirizine (ZYRTEC) 10 MG tablet Take 1 tablet (10 mg total) by mouth daily as needed for " Allergies., Starting Mon 7/2/2018, OTC      !! CIPRODEX 0.3-0.1 % DrpS Starting 12/1/2016, Until Discontinued, Historical Med      !! ciprofloxacin-dexamethasone 0.3-0.1% (CIPRODEX) 0.3-0.1 % DrpS Ciprodex 0.3 %-0.1 % ear drops,suspension   INSTILL 4 DROPS INTO AFFECTED EAR(S) BY OTIC ROUTE 2 TIMES PER DAY FOR 7 DAYS, Historical Med      CREON 24,000-76,000 -120,000 unit capsule Take 2 capsules by mouth three times daily with food., Starting Wed 5/9/2018, Until Mon 11/5/2018, Normal      diazePAM (VALIUM) 5 MG tablet TK 1 T PO Q 8 H PRN, Historical Med      docusate sodium (COLACE) 100 MG capsule Take 100 mg by mouth 2 (two) times daily., Until Discontinued, Historical Med      ergocalciferol (ERGOCALCIFEROL) 50,000 unit Cap TK 1 C PO  Q WEEKLY, Historical Med      ethacrynic acid (EDECRIN) 25 mg Tab Take 25 mg by mouth 2 (two) times daily as needed. , Historical Med      irbesartan (AVAPRO) 150 MG tablet Take 1 tablet (150 mg total) by mouth once daily., Starting Wed 5/9/2018, Normal      levocetirizine (XYZAL) 5 MG tablet TAKE 1 TABLET BY MOUTH ONCE DAILY FOR ALLERGIES, Normal      LINZESS 290 mcg Cap Starting 11/28/2016, Until Discontinued, Historical Med      LORazepam (ATIVAN) 0.5 MG tablet lorazepam 0.5 mg tablet PRN, Historical Med      magnesium oxide (MAG-OX) 400 mg tablet Take 400 mg by mouth once daily., Until Discontinued, Historical Med      naloxone (NARCAN) 4 mg/actuation Spry Narcan 4 mg/actuation nasal spray   Take by nasal route., Historical Med      omeprazole (PRILOSEC) 40 MG capsule TK 1 C PO  QD, Historical Med      ondansetron (ZOFRAN) 4 MG tablet Take 2 tablets (8 mg total) by mouth 2 (two) times daily., Starting Mon 7/2/2018, Normal      oxycodone (ROXICODONE) 15 MG Tab TK 1 T PO QID PRN, Historical Med      OXYGEN-AIR DELIVERY SYSTEMS MISC 2 L by Nasal route nightly as needed., Until Discontinued, Historical Med      predniSONE (DELTASONE) 5 MG tablet Take 1 tablet (5 mg total) by mouth  "once daily., Starting Wed 5/9/2018, Normal      tacrolimus (PROGRAF) 1 MG Cap Take 3 capsules (3 mg total) by mouth every 12 (twelve) hours., Starting Mon 4/23/2018, Until Mon 11/5/2018, Normal      triazolam (HALCION) 0.25 MG Tab Take 1 tablet (0.25 mg total) by mouth nightly as needed., Starting Tue 10/2/2018, Normal      warfarin (COUMADIN) 5 MG tablet Take 5 mg by mouth Daily. , Starting Tue 5/8/2018, Historical Med       !! - Potential duplicate medications found. Please discuss with provider.      STOP taking these medications       epinephrine (EPIPEN JR) 0.15 mg/0.3 mL pen injection Comments:   Reason for Stopping:                 Discharge Procedure Orders (must include Diet, Follow-up, Activity)  No discharge procedures on file.     Discharge instructions - Please return to clinic (contact pediatrician etc..) if:  1) Persistent cough.  2) Respiratory difficulty (including: noisy breathing, nasal flaring, "barky" cough or wheezing).  3) Persistent pain not responsive to prescribed medications (if any).  4) Change in current mental status (age appropriate).  5) Repeating or recurrent episodes of vomiting.  6) Inability to tolerate oral fluids.      "

## 2018-11-06 NOTE — DISCHARGE INSTRUCTIONS
Magnetic Resonance Imaging (MRI)     You will be asked to hold very still during the scan.     Magnetic resonance imaging (MRI) is a test that lets your doctor see detailed pictures of the inside of your body. MRI combines the use of strong magnets and radio waves to form an MRI image.  How do I get ready for an MRI?  · Follow any directions you are given for not eating or drinking before the test.  · Ask your provider if you should stop taking any medicine before the test.  · Follow your normal daily routine unless your provider tells you otherwise.  · You'll be asked to remove your watch, jewelry, hearing aids, credit cards, pens, pocket knives, eyeglasses, and other metal objects.  · You may be asked to remove your makeup. Makeup may contain some metal.  · Most MRI tests take 30 to 60 minutes. Depending on the type of MRI you are having, the test may take longer. Give yourself extra time to check in.     MRI uses strong magnets. Metal is affected by magnets and can distort the image. The magnet used in MRI can cause metal objects in your body to move. If you have a metal implant, you may not be able to have an MRI unless the implant is certified as MRI safe. People with these implants should not have an MRI:  · Ear (cochlear) implants  · Certain clips used for brain aneurysms  · Certain metal coils put in blood vessels  · Most defibrillators  · Most pacemakers  Be sure to tell the radiologist or technologist if you:  · Have had any previous surgeries  · Have a pacemaker, surgical clips, metal plate or pins, an artificial joint, staples or screws, ear (cochlear) implants, or other implants  · Wear a medicated adhesive patch  · Have metal splinters in your body  · Have implanted nerve stimulators or drug-infusion ports  · Have tattoos or body piercings. Some tattoo inks contain metal.  · Work with metal  · Have braces. You must remove any dental work.  · Have a bullet or other metal in your body  Also tell the  radiologist or technologist if you:  · Are pregnant or think you may be  · Are afraid of small, enclosed spaces (claustrophobic)  · Are allergic to X-ray dye (contrast medium), iodine, shellfish, or any medicines  · Have other allergies  · Are breastfeeding  · Have a history of cancer  · Have any serious health problems. This includes kidney disease or a liver transplant. You may not be able to have the contrast material used for MRI.   What happens during an MRI?  · You may be asked to wear a hospital gown.  · You may be given earplugs to wear if you need them.  · You may be injected with a special dye (contrast) that improves the MRI image.   · Youll lie down on a platform that slides into the magnet.  What happens after an MRI?  · You can get back to normal activities right away. If you were given contrast, it will pass naturally through your body within a day. You may be told to drink more water or other fluids during this time.   · Your doctor will discuss the test results with you during a follow-up appointment or over the phone.  · Your next appointment is: __________________  Date Last Reviewed: 6/2/2015  © 8864-9704 [employer]. [address] [place] [address], [place] [address]. All rights reserved. This information is not intended as a substitute for professional medical care. Always follow your healthcare professional's instructions.

## 2018-11-06 NOTE — ANESTHESIA POSTPROCEDURE EVALUATION
"Anesthesia Post Evaluation    Patient: Alida Chacon    Procedure(s) Performed: Procedure(s) (LRB):  MRI (Magnetic Resonance Imagine) (N/A)    Final Anesthesia Type: general  Patient location during evaluation: PACU  Patient participation: Yes- Able to Participate  Level of consciousness: awake and alert and oriented  Post-procedure vital signs: reviewed and stable  Pain management: adequate  Airway patency: patent  PONV status at discharge: No PONV  Anesthetic complications: no      Cardiovascular status: blood pressure returned to baseline and hemodynamically stable  Respiratory status: unassisted and spontaneous ventilation  Hydration status: euvolemic  Follow-up not needed.        Visit Vitals  BP (!) 156/87   Pulse 85   Temp 36.6 °C (97.8 °F) (Temporal)   Resp 18   Ht 5' 3" (1.6 m)   Wt 114.8 kg (253 lb)   SpO2 98%   Breastfeeding? No   BMI 44.82 kg/m²       Pain/Surya Score: Pain Assessment Performed: Yes (11/6/2018  2:55 PM)  Presence of Pain: denies (11/6/2018  2:55 PM)  Pain Rating Prior to Med Admin: 10 (11/6/2018  2:55 PM)  Pain Rating Post Med Admin: 0 (11/6/2018  2:55 PM)  Surya Score: 10 (11/6/2018  2:17 PM)        "

## 2018-11-07 ENCOUNTER — PATIENT MESSAGE (OUTPATIENT)
Dept: FAMILY MEDICINE | Facility: CLINIC | Age: 63
End: 2018-11-07

## 2018-11-07 ENCOUNTER — OFFICE VISIT (OUTPATIENT)
Dept: ORTHOPEDICS | Facility: CLINIC | Age: 63
End: 2018-11-07
Payer: MEDICARE

## 2018-11-07 VITALS
DIASTOLIC BLOOD PRESSURE: 68 MMHG | SYSTOLIC BLOOD PRESSURE: 114 MMHG | HEART RATE: 66 BPM | BODY MASS INDEX: 45.32 KG/M2 | HEIGHT: 63 IN | WEIGHT: 255.75 LBS

## 2018-11-07 DIAGNOSIS — M70.62 GREATER TROCHANTERIC BURSITIS OF LEFT HIP: Primary | ICD-10-CM

## 2018-11-07 PROCEDURE — 99213 OFFICE O/P EST LOW 20 MIN: CPT | Mod: PBBFAC | Performed by: ORTHOPAEDIC SURGERY

## 2018-11-07 PROCEDURE — 99214 OFFICE O/P EST MOD 30 MIN: CPT | Mod: S$PBB,,, | Performed by: ORTHOPAEDIC SURGERY

## 2018-11-07 PROCEDURE — 99999 PR PBB SHADOW E&M-EST. PATIENT-LVL III: CPT | Mod: PBBFAC,,, | Performed by: ORTHOPAEDIC SURGERY

## 2018-11-07 RX ORDER — DICLOFENAC SODIUM 10 MG/G
2 GEL TOPICAL 4 TIMES DAILY
Qty: 1 TUBE | Refills: 6 | Status: SHIPPED | OUTPATIENT
Start: 2018-11-07 | End: 2019-04-02

## 2018-11-07 NOTE — PROGRESS NOTES
"DATE: 11/7/2018  PATIENT: Alida Chacon    Supervising Physician: Juancarlos Walter M.D.    CHIEF COMPLAINT: low back and left leg pain    HISTORY:  Alida Chacon is a 63 y.o. female with PMH of kidney transplant here for initial evaluation of low back and left leg and left hip pain (Back - 10, Leg - 10 , Hip -10).  The pain has been present since 1990 but has progressively worsened over the last year. The patient describes the pain as aching and sharp.  The pain is worse with standing and walking and improved by sitting and leaning forward. The pain is worse with any weightbearing on the left leg.  There is pain in the left hip with any weightbearing as well.  There is associated numbness and tingling. There is subjective weakness.  She ambulates with a cane.  Prior treatments have included pain medications prescribed by pain management, physical therapy, ESIs and hip joint injections a couple years ago, but no surgery.  She says she had an allergic reaction to the injections and cannot have injections anymore.  She was seen by Dr. Correia 7/20 who told her she had AVN of the left hip and needed a hip replacement.     The patient denies myelopathic symptoms such as handwriting changes or difficulty with buttons/coins/keys. Denies perineal paresthesias, bowel/bladder dysfunction.     EXAM:  /68   Pulse 66   Ht 5' 3" (1.6 m)   Wt 116 kg (255 lb 11.7 oz)   BMI 45.30 kg/m²     General: The patient is a pleasant 63 y.o. female in no apparent distress, the patient is oriented to person, place and time.  Psych: Normal mood and affect  HEENT: Vision grossly intact, hearing intact to the spoken word.  Lungs: Respirations unlabored.  Gait: Antalgic station and gait, unable to perform toe or heel walk. She ambulates with a cane.  Skin: Dorsal lumbar skin negative for rashes, lesions, hairy patches and surgical scars. There is mild lumbar tenderness to palpation.  Range of motion: Lumbar range of motion is " acceptable.  Spinal Balance: Global saggital and coronal spinal balance acceptable, not significant for scoliosis and kyphosis.  Musculoskeletal: There is pain in the hip with the range of motion of the left hip. Mild left trochanteric tenderness to palpation.  Vascular: Bilateral lower extremities warm and well perfused, dorsalis pedis pulses 2+ bilaterally.  Neurological: Normal strength and tone in all major motor groups in the bilateral lower extremities. Normal sensation to light touch in the L2-S1 dermatomes bilaterally.  Deep tendon reflexes symmetric 2+ in the bilateral lower extremities.  Negative Babinski bilaterally. Straight leg raise positive bilaterally, reproduces back pain.    IMAGING:      Today I personally reviewed AP, Lat and Flex/Ex  upright L-spine films that demonstrate grade I anterolisthesis of L4/5.  MRI demonstrates lumbar DDD with some stenosis at L4/5 and L5/1. Significant facet hypertrophy at aforementioned levels present with probable pars fracture at L5 indicative of spondylolysis     Body mass index is 45.3 kg/m².    No results found for: HGBA1C        ASSESSMENT/PLAN:    Alida was seen today for follow-up.    Diagnoses and all orders for this visit:    Greater trochanteric bursitis of left hip  -     diclofenac sodium (VOLTAREN) 1 % Gel; Apply 2 g topically 4 (four) times daily. for 10 days  -     Ambulatory Referral to Physical/Occupational Therapy  -     Back/Cervical Brace For Home Use          64yo female with left hip greater trochanteric bursitis and LBP without radiculopathy    Recommend aquatic therapy  Recommend ultrasound / alternative modalities for GT bursitis  Given that she cannot have steroids the next step may be a bursa excision.

## 2018-11-08 DIAGNOSIS — R22.30 AXILLARY MASS, UNSPECIFIED LATERALITY: Primary | ICD-10-CM

## 2018-11-08 DIAGNOSIS — Z12.31 ENCOUNTER FOR SCREENING MAMMOGRAM FOR MALIGNANT NEOPLASM OF BREAST: ICD-10-CM

## 2018-11-09 ENCOUNTER — HOSPITAL ENCOUNTER (OUTPATIENT)
Dept: RADIOLOGY | Facility: HOSPITAL | Age: 63
Discharge: HOME | End: 2018-11-09
Attending: NURSE PRACTITIONER
Payer: MEDICARE

## 2018-11-09 ENCOUNTER — PATIENT MESSAGE (OUTPATIENT)
Dept: FAMILY MEDICINE | Facility: CLINIC | Age: 63
End: 2018-11-09

## 2018-11-09 VITALS — HEIGHT: 63 IN | WEIGHT: 255 LBS | BODY MASS INDEX: 45.18 KG/M2

## 2018-11-09 DIAGNOSIS — Z12.31 ENCOUNTER FOR SCREENING MAMMOGRAM FOR MALIGNANT NEOPLASM OF BREAST: ICD-10-CM

## 2018-11-09 DIAGNOSIS — R22.30 AXILLARY MASS, UNSPECIFIED LATERALITY: ICD-10-CM

## 2018-11-09 PROCEDURE — 77067 SCR MAMMO BI INCL CAD: CPT | Mod: TC

## 2018-11-09 PROCEDURE — 77067 SCR MAMMO BI INCL CAD: CPT | Mod: 26,,, | Performed by: RADIOLOGY

## 2018-11-09 RX ORDER — ALENDRONATE SODIUM 70 MG/1
TABLET ORAL
Qty: 12 TABLET | Refills: 3 | Status: SHIPPED | OUTPATIENT
Start: 2018-11-09 | End: 2018-11-12 | Stop reason: SDUPTHER

## 2018-11-09 RX ORDER — OMEPRAZOLE 40 MG/1
CAPSULE, DELAYED RELEASE ORAL
Qty: 90 CAPSULE | Refills: 3 | Status: SHIPPED | OUTPATIENT
Start: 2018-11-09 | End: 2018-11-12 | Stop reason: SDUPTHER

## 2018-11-09 RX ORDER — ERGOCALCIFEROL 1.25 MG/1
CAPSULE ORAL
Qty: 12 CAPSULE | Refills: 9 | Status: SHIPPED | OUTPATIENT
Start: 2018-11-09 | End: 2018-11-12 | Stop reason: SDUPTHER

## 2018-11-12 RX ORDER — ALENDRONATE SODIUM 70 MG/1
TABLET ORAL
Qty: 12 TABLET | Refills: 3 | Status: SHIPPED | OUTPATIENT
Start: 2018-11-12 | End: 2020-01-28 | Stop reason: SDUPTHER

## 2018-11-12 RX ORDER — OMEPRAZOLE 40 MG/1
CAPSULE, DELAYED RELEASE ORAL
Qty: 90 CAPSULE | Refills: 3 | Status: SHIPPED | OUTPATIENT
Start: 2018-11-12 | End: 2020-01-28 | Stop reason: SDUPTHER

## 2018-11-12 RX ORDER — ERGOCALCIFEROL 1.25 MG/1
CAPSULE ORAL
Qty: 12 CAPSULE | Refills: 9 | Status: SHIPPED | OUTPATIENT
Start: 2018-11-12 | End: 2020-01-24

## 2018-11-13 ENCOUNTER — OFFICE VISIT (OUTPATIENT)
Dept: FAMILY MEDICINE | Facility: CLINIC | Age: 63
End: 2018-11-13
Payer: MEDICARE

## 2018-11-13 VITALS
BODY MASS INDEX: 45.18 KG/M2 | HEIGHT: 63 IN | SYSTOLIC BLOOD PRESSURE: 157 MMHG | WEIGHT: 255 LBS | RESPIRATION RATE: 18 BRPM | HEART RATE: 75 BPM | OXYGEN SATURATION: 96 % | DIASTOLIC BLOOD PRESSURE: 81 MMHG

## 2018-11-13 DIAGNOSIS — H60.62 CHRONIC OTITIS EXTERNA OF LEFT EAR, UNSPECIFIED TYPE: Primary | ICD-10-CM

## 2018-11-13 PROCEDURE — 99213 OFFICE O/P EST LOW 20 MIN: CPT | Mod: S$PBB,,, | Performed by: NURSE PRACTITIONER

## 2018-11-13 PROCEDURE — 99213 OFFICE O/P EST LOW 20 MIN: CPT | Mod: PBBFAC,PN | Performed by: NURSE PRACTITIONER

## 2018-11-13 PROCEDURE — 99999 PR PBB SHADOW E&M-EST. PATIENT-LVL III: CPT | Mod: PBBFAC,,, | Performed by: NURSE PRACTITIONER

## 2018-11-13 RX ORDER — CIPROFLOXACIN AND DEXAMETHASONE 3; 1 MG/ML; MG/ML
SUSPENSION/ DROPS AURICULAR (OTIC)
Qty: 7.5 ML | Refills: 0 | Status: SHIPPED | OUTPATIENT
Start: 2018-11-13 | End: 2019-08-30 | Stop reason: SDUPTHER

## 2018-11-13 NOTE — PROGRESS NOTES
Chief Complaint  Chief Complaint   Patient presents with    Follow-up    lump under right arm pit    left ear pain       HPI:  Alida Chacon is a 63 y.o. female with medical diagnoses as listed and reviewed within the medical history and problem list that presents for pain to her left ear. Pt has chronic ear infections to her left ear with multiple surgeries to this ear as well. She reports drainage of clear/yellow fluid from the left ear. Pt denies fever or chills. Pt has a lump to the outer aspect of her right breast/axilla. She has had breast biopsies in the past that have been negative. Pt had a mammogram yesterday and are waiting for results and records from the previous provider in [place]. No redness noted.     PAST MEDICAL HISTORY:  Past Medical History:   Diagnosis Date    Arthritis     djd, problem lying flat    Cataract     OS    Chronic pancreatitis     Encounter for blood transfusion     Hives due to cold exposure     Hypertension     Lupus     Medication intolerance     taking prilosec for the prevention after transplant    Seasonal allergies     Sleep apnea     Stroke 2005    Thyroid disease     parathyroid disease       PAST SURGICAL HISTORY:  Past Surgical History:   Procedure Laterality Date    BREAST BIOPSY Bilateral 2004    BREAST LUMPECTOMY      BREAST SURGERY      biopsy x 3    CATARACT EXTRACTION Right 01/18/2017    sn60wf 19.0D.//    CHOLECYSTECTOMY      dialysis graft      all nonfuctional    HERNIA REPAIR      hiatal hernia repair    KIDNEY TRANSPLANT Right     MAGNETIC RESONANCE IMAGING N/A 11/6/2018    Procedure: MRI (Magnetic Resonance Imagine);  Surgeon: Charlene Surgeon;  Location: [facility];  Service: Anesthesiology;  Laterality: N/A;    MRI (Magnetic Resonance Imagine) N/A 11/6/2018    Performed by Charlene Surgeon at [facility]    phaco/pciol Right 1/18/2017    [facility]    STOMACH SURGERY      ulcer repair       SOCIAL  HISTORY:  Social History     Socioeconomic History    Marital status:      Spouse name: Not on file    Number of children: Not on file    Years of education: Not on file    Highest education level: Not on file   Social Needs    Financial resource strain: Not on file    Food insecurity - worry: Not on file    Food insecurity - inability: Not on file    Transportation needs - medical: Not on file    Transportation needs - non-medical: Not on file   Occupational History    Not on file   Tobacco Use    Smoking status: Never Smoker    Smokeless tobacco: Never Used   Substance and Sexual Activity    Alcohol use: No    Drug use: No    Sexual activity: Not on file   Other Topics Concern    Not on file   Social History Narrative    Not on file       FAMILY HISTORY:  Family History   Problem Relation Age of Onset    Cataracts Mother     Hypertension Mother     Diabetes Father     Hypertension Father     Thyroid disease Father     Strabismus Brother     Macular degeneration Maternal Uncle     Amblyopia Neg Hx     Blindness Neg Hx     Cancer Neg Hx     Glaucoma Neg Hx     Retinal detachment Neg Hx     Stroke Neg Hx     Breast cancer Neg Hx        ALLERGIES AND MEDICATIONS: updated and reviewed.  Review of patient's allergies indicates:   Allergen Reactions    Adhesive tape-silicones Blisters     TEGADERM IS OKAY    Bee venom protein (honey bee) Anaphylaxis    Cat/feline products Shortness Of Breath    Cytoxan [cyclophosphamide] Anaphylaxis    Penicillins Anaphylaxis    Sulfa (sulfonamide antibiotics) Anaphylaxis    Tetracyclines Anaphylaxis    Compazine [prochlorperazine edisylate] Hives    Lasix [furosemide] Hives    Morphine Other (See Comments)     Pt reports severe headache    Versed [midazolam] Other (See Comments)     agitation    Vicodin [hydrocodone-acetaminophen] Other (See Comments)     Pt reports having severe headaches     Cortisone      Angioedema with an  injectible    Erythromycin Hives, Itching and Swelling    Erythromycin base     Preservatives [preservative]      Angioedema     Current Outpatient Medications   Medication Sig Dispense Refill    alendronate (FOSAMAX) 70 MG tablet TK 1 T PO ONCE WEEKLY 12 tablet 3    calcium-magnesium 300-300 mg Tab Calcium Magnesium      cetirizine (ZYRTEC) 10 MG tablet Take 1 tablet (10 mg total) by mouth daily as needed for Allergies. 90 tablet 3    ciprofloxacin-dexamethasone 0.3-0.1% (CIPRODEX) 0.3-0.1 % DrpS na 7.5 mL 0    CREON 24,000-76,000 -120,000 unit capsule Take 2 capsules by mouth three times daily with food. 540 capsule 3    diazePAM (VALIUM) 5 MG tablet TK 1 T PO Q 8 H PRN  5    diclofenac sodium (VOLTAREN) 1 % Gel Apply 2 g topically 4 (four) times daily. for 10 days 1 Tube 6    docusate sodium (COLACE) 100 MG capsule Take 100 mg by mouth 2 (two) times daily.      ergocalciferol (ERGOCALCIFEROL) 50,000 unit Cap TK 1 C PO  Q WEEKLY 12 capsule 9    ethacrynic acid (EDECRIN) 25 mg Tab Take 25 mg by mouth 2 (two) times daily as needed.       irbesartan (AVAPRO) 150 MG tablet Take 1 tablet (150 mg total) by mouth once daily. 90 tablet 3    levocetirizine (XYZAL) 5 MG tablet TAKE 1 TABLET BY MOUTH ONCE DAILY FOR ALLERGIES 90 tablet 3    LINZESS 290 mcg Cap       LORazepam (ATIVAN) 0.5 MG tablet lorazepam 0.5 mg tablet PRN      magnesium oxide (MAG-OX) 400 mg tablet Take 400 mg by mouth once daily.      naloxone (NARCAN) 4 mg/actuation Spry Narcan 4 mg/actuation nasal spray   Take by nasal route.      omeprazole (PRILOSEC) 40 MG capsule TK 1 C PO  QD 90 capsule 3    ondansetron (ZOFRAN) 4 MG tablet Take 2 tablets (8 mg total) by mouth 2 (two) times daily. 180 tablet 3    oxycodone (ROXICODONE) 15 MG Tab TK 1 T PO QID PRN  0    OXYGEN-AIR DELIVERY SYSTEMS MISC 2 L by Nasal route nightly as needed.      predniSONE (DELTASONE) 5 MG tablet Take 1 tablet (5 mg total) by mouth once daily. 90 tablet 3     "tacrolimus (PROGRAF) 1 MG Cap Take 3 capsules (3 mg total) by mouth every 12 (twelve) hours. 30 capsule 0    triazolam (HALCION) 0.25 MG Tab Take 1 tablet (0.25 mg total) by mouth nightly as needed. 30 tablet 2    warfarin (COUMADIN) 5 MG tablet Take 5 mg by mouth Daily.        No current facility-administered medications for this visit.          ROS  Review of Systems   Constitutional: Negative for appetite change, chills, fatigue and fever.   HENT: Positive for ear discharge, ear pain and postnasal drip. Negative for congestion, rhinorrhea and sore throat.    Respiratory: Negative for cough, chest tightness, shortness of breath and wheezing.    Cardiovascular: Negative for chest pain and palpitations.   Gastrointestinal: Negative for abdominal distention, abdominal pain, constipation, diarrhea, nausea and vomiting.   Genitourinary: Negative for dysuria.   Musculoskeletal: Positive for arthralgias, back pain and myalgias.   Skin: Negative for color change and rash.   Neurological: Negative for dizziness, weakness and headaches.   Psychiatric/Behavioral: Negative for confusion and sleep disturbance. The patient is not nervous/anxious.            PHYSICAL EXAM  Vitals:    11/13/18 1338   BP: (!) 157/81   BP Location: Left arm   Patient Position: Sitting   Pulse: 75   Resp: 18   SpO2: 96%   Weight: 115.7 kg (255 lb)   Height: 5' 3" (1.6 m)    Body mass index is 45.17 kg/m².  Weight: 115.7 kg (255 lb)   Height: 5' 3" (160 cm)       Physical Exam   Constitutional: She is oriented to person, place, and time. She appears well-developed and well-nourished.   HENT:   Head: Normocephalic.   Right Ear: Hearing, tympanic membrane, external ear and ear canal normal. Tympanic membrane is not erythematous and not bulging.   Left Ear: There is drainage. Tympanic membrane is erythematous.   Nose: Mucosal edema and rhinorrhea present.   Mouth/Throat: No posterior oropharyngeal erythema.   Eyes: Pupils are equal, round, and " reactive to light.   Neck: Normal range of motion. Neck supple.   Cardiovascular: Normal rate, regular rhythm, S1 normal and S2 normal.   No murmur heard.  Pulmonary/Chest: Effort normal and breath sounds normal. She has no wheezes.   Abdominal: Soft. Normal appearance and bowel sounds are normal. She exhibits no distension. There is no tenderness.   Musculoskeletal:   Slow gait. Walks with cane     Neurological: She is alert and oriented to person, place, and time.   Skin: Skin is warm and dry. Capillary refill takes less than 2 seconds.   Psychiatric: She has a normal mood and affect. Her speech is normal and behavior is normal. Thought content normal. Cognition and memory are normal.   Vitals reviewed.        Health Maintenance       Date Due Completion Date    Hepatitis C Screening 1955 ---    TETANUS VACCINE 10/26/1974 ---    Pap Smear with HPV Cotest 10/26/1977 ---    Colonoscopy 10/26/2006 ---    Mammogram 03/09/2017 3/9/2015    Influenza Vaccine 08/01/2018 ---    Lipid Panel 05/17/2021 5/17/2016               Assessment & Plan    Alida was seen today for follow-up, lump under right arm pit and left ear pain.    Diagnoses and all orders for this visit:    Chronic otitis externa of left ear, unspecified type  -     ciprofloxacin-dexamethasone 0.3-0.1% (CIPRODEX) 0.3-0.1 % DrpS; na        Follow-up: Follow-up if symptoms worsen or fail to improve.      Risks, benefits, and side effects were discussed with the patient. All questions were answered to the fullest satisfaction of the patient, and pt verbalized understanding and agreement to treatment plan. Pt was to call with any new or worsening symptoms, or present to the ER.

## 2018-11-14 ENCOUNTER — TELEPHONE (OUTPATIENT)
Dept: FAMILY MEDICINE | Facility: CLINIC | Age: 63
End: 2018-11-14

## 2018-11-14 NOTE — TELEPHONE ENCOUNTER
Sig (4 gtts in both ears x 10 days) given to pharmacy. No other concerns at this time.     ----- Message from Isabell Carr sent at 11/14/2018 11:16 AM CST -----  Type:  Pharmacy Calling to Clarify an RX    Name of Caller:  Nettie  Pharmacy Name: Pharmacy in [facility]  Prescription Name:  Ciprodex  What do they need to clarify?:  No directions  Best Call Back Number:  [phone]

## 2018-11-16 ENCOUNTER — PATIENT MESSAGE (OUTPATIENT)
Dept: ORTHOPEDICS | Facility: CLINIC | Age: 63
End: 2018-11-16

## 2018-11-19 ENCOUNTER — PATIENT MESSAGE (OUTPATIENT)
Dept: FAMILY MEDICINE | Facility: CLINIC | Age: 63
End: 2018-11-19

## 2018-11-19 ENCOUNTER — PATIENT MESSAGE (OUTPATIENT)
Dept: SPINE | Facility: CLINIC | Age: 63
End: 2018-11-19

## 2018-11-20 ENCOUNTER — PATIENT MESSAGE (OUTPATIENT)
Dept: ORTHOPEDICS | Facility: CLINIC | Age: 63
End: 2018-11-20

## 2018-12-03 ENCOUNTER — PATIENT MESSAGE (OUTPATIENT)
Dept: FAMILY MEDICINE | Facility: CLINIC | Age: 63
End: 2018-12-03

## 2018-12-26 ENCOUNTER — TELEPHONE (OUTPATIENT)
Dept: FAMILY MEDICINE | Facility: CLINIC | Age: 63
End: 2018-12-26

## 2018-12-26 NOTE — TELEPHONE ENCOUNTER
----- Message from Oxana Bejarano NP sent at 12/26/2018 10:35 AM CST -----  Please let this patient know that her mammogram was normal. Thank you.

## 2019-01-02 ENCOUNTER — OFFICE VISIT (OUTPATIENT)
Dept: FAMILY MEDICINE | Facility: CLINIC | Age: 64
End: 2019-01-02
Payer: MEDICARE

## 2019-01-02 ENCOUNTER — PATIENT MESSAGE (OUTPATIENT)
Dept: FAMILY MEDICINE | Facility: CLINIC | Age: 64
End: 2019-01-02

## 2019-01-02 VITALS
HEART RATE: 65 BPM | WEIGHT: 255.19 LBS | OXYGEN SATURATION: 96 % | TEMPERATURE: 98 F | HEIGHT: 63 IN | BODY MASS INDEX: 45.21 KG/M2 | DIASTOLIC BLOOD PRESSURE: 84 MMHG | RESPIRATION RATE: 18 BRPM | SYSTOLIC BLOOD PRESSURE: 138 MMHG

## 2019-01-02 DIAGNOSIS — G89.29 CHRONIC BACK PAIN, UNSPECIFIED BACK LOCATION, UNSPECIFIED BACK PAIN LATERALITY: Primary | ICD-10-CM

## 2019-01-02 DIAGNOSIS — D68.62 LUPUS ANTICOAGULANT DISORDER: ICD-10-CM

## 2019-01-02 DIAGNOSIS — M54.9 CHRONIC BACK PAIN, UNSPECIFIED BACK LOCATION, UNSPECIFIED BACK PAIN LATERALITY: Primary | ICD-10-CM

## 2019-01-02 DIAGNOSIS — M70.62 GREATER TROCHANTERIC BURSITIS OF LEFT HIP: ICD-10-CM

## 2019-01-02 DIAGNOSIS — G47.09 OTHER INSOMNIA: ICD-10-CM

## 2019-01-02 PROBLEM — K86.1 CHRONIC PANCREATITIS: Status: ACTIVE | Noted: 2018-03-09

## 2019-01-02 PROCEDURE — 99214 OFFICE O/P EST MOD 30 MIN: CPT | Mod: S$GLB,,, | Performed by: FAMILY MEDICINE

## 2019-01-02 PROCEDURE — 99214 PR OFFICE/OUTPT VISIT, EST, LEVL IV, 30-39 MIN: ICD-10-PCS | Mod: S$GLB,,, | Performed by: FAMILY MEDICINE

## 2019-01-02 RX ORDER — TRAZODONE HYDROCHLORIDE 50 MG/1
50 TABLET ORAL NIGHTLY
Qty: 90 TABLET | Refills: 0 | Status: SHIPPED | OUTPATIENT
Start: 2019-01-02 | End: 2019-07-01

## 2019-01-02 NOTE — PROGRESS NOTES
Subjective:       Patient ID: Alida Chacon is a 63 y.o. female.    Chief Complaint: Follow-up (3 month )    HPI   Patient presents for follow-up. No longer getting relief of her insomnia with halcion. Reports getting 1-3 hours of sleep a night. States she has tried other medications in the past, including ambien, which did not work. Denies daytime naps, admits to some daytime drowsiness.     Wants to resume physical therapy. Reports her back pain has gotten worse and that at one point she was doing pt 3 times a week plus aquatherapy. Currently seeing pain mgt and is in the process of being referred to a new spine doctor.    Checks her INR at home once a week. Was told to keep her INR 1.8-2.2. Previously, results were being faxed to the clinic, but we have not gotten any results since switching to [facility].    Review of Systems   Constitutional: Negative for chills, fatigue, fever and unexpected weight change.   Respiratory: Negative for cough, chest tightness, shortness of breath and wheezing.    Cardiovascular: Negative for chest pain, palpitations and leg swelling.   Musculoskeletal: Positive for arthralgias, back pain and gait problem. Negative for myalgias.   Psychiatric/Behavioral: Positive for sleep disturbance. Negative for decreased concentration, dysphoric mood, hallucinations and suicidal ideas.       Past Medical History:   Diagnosis Date    Arthritis     djd, problem lying flat    Cataract     OS    Chronic pancreatitis     Encounter for blood transfusion     Hives due to cold exposure     Hypertension     Lupus     Medication intolerance     taking prilosec for the prevention after transplant    Seasonal allergies     Sleep apnea     Stroke 2005    Thyroid disease     parathyroid disease     Past Surgical History:   Procedure Laterality Date    BREAST BIOPSY Bilateral 2004    BREAST LUMPECTOMY      BREAST SURGERY      biopsy x 3    CATARACT EXTRACTION Right 01/18/2017    sn60wf  "19.0D.//    CHOLECYSTECTOMY      dialysis graft      all nonfuctional    HERNIA REPAIR      hiatal hernia repair    KIDNEY TRANSPLANT Right     MRI (Magnetic Resonance Imagine) N/A 11/6/2018    [facility]    phaco/pciol Right 1/18/2017    [facility]    STOMACH SURGERY      ulcer repair     Social History     Socioeconomic History    Marital status:      Spouse name: Not on file    Number of children: Not on file    Years of education: Not on file    Highest education level: Not on file   Social Needs    Financial resource strain: Not on file    Food insecurity - worry: Not on file    Food insecurity - inability: Not on file    Transportation needs - medical: Not on file    Transportation needs - non-medical: Not on file   Occupational History    Not on file   Tobacco Use    Smoking status: Never Smoker    Smokeless tobacco: Never Used   Substance and Sexual Activity    Alcohol use: No    Drug use: No    Sexual activity: No   Other Topics Concern    Not on file   Social History Narrative    Not on file     Family History   Problem Relation Age of Onset    Cataracts Mother     Hypertension Mother     Diabetes Father     Hypertension Father     Thyroid disease Father     Strabismus Brother     Macular degeneration Maternal Uncle     Amblyopia Neg Hx     Blindness Neg Hx     Cancer Neg Hx     Glaucoma Neg Hx     Retinal detachment Neg Hx     Stroke Neg Hx     Breast cancer Neg Hx        Objective:      /84 (BP Location: Right arm, Patient Position: Sitting, BP Method: Medium (Automatic))   Pulse 65   Temp 97.9 °F (36.6 °C) (Oral)   Resp 18   Ht 5' 3" (1.6 m)   Wt 115.8 kg (255 lb 3.2 oz)   SpO2 96%   BMI 45.21 kg/m²   Physical Exam   Constitutional: She is oriented to person, place, and time. She appears well-developed and well-nourished. No distress.   Obese, ambulates with cane "   Cardiovascular: Normal rate, regular rhythm and normal heart sounds.   No murmur heard.  Pulmonary/Chest: Effort normal and breath sounds normal. No stridor. No respiratory distress.   Neurological: She is alert and oriented to person, place, and time.   Skin: She is not diaphoretic.   Psychiatric: She has a normal mood and affect. Her behavior is normal. Judgment and thought content normal.   Vitals reviewed.      Assessment:       1. Chronic back pain, unspecified back location, unspecified back pain laterality    2. Other insomnia    3. Greater trochanteric bursitis of left hip    4. Lupus anticoagulant disorder        Plan:       Chronic back pain, unspecified back location, unspecified back pain laterality  -     Ambulatory Referral to Physical/Occupational Therapy    Other insomnia  -     Stop halcion  -     traZODone (DESYREL) 50 MG tablet; Take 1 tablet (50 mg total) by mouth every evening.  Dispense: 90 tablet; Refill: 0    Greater trochanteric bursitis of left hip  -     Ambulatory Referral to Physical/Occupational Therapy    Lupus anticoagulant disorder  - continue to monitor INR weekly; patient to give home INR company our new fax number          Risks, benefits, and side effects were discussed with the patient. All questions were answered to the fullest satisfaction of the patient, and pt verbalized understanding and agreement to treatment plan. Pt was to call with any new or worsening symptoms, or present to the ER.

## 2019-01-21 ENCOUNTER — PATIENT MESSAGE (OUTPATIENT)
Dept: FAMILY MEDICINE | Facility: CLINIC | Age: 64
End: 2019-01-21

## 2019-01-21 DIAGNOSIS — Z94.0 KIDNEY TRANSPLANT RECIPIENT: Primary | ICD-10-CM

## 2019-01-21 RX ORDER — TACROLIMUS 1 MG/1
3 CAPSULE ORAL EVERY 12 HOURS
Qty: 540 CAPSULE | Refills: 1 | Status: SHIPPED | OUTPATIENT
Start: 2019-01-21 | End: 2019-07-31 | Stop reason: SDUPTHER

## 2019-01-29 ENCOUNTER — PATIENT MESSAGE (OUTPATIENT)
Dept: FAMILY MEDICINE | Facility: CLINIC | Age: 64
End: 2019-01-29

## 2019-04-02 ENCOUNTER — OFFICE VISIT (OUTPATIENT)
Dept: FAMILY MEDICINE | Facility: CLINIC | Age: 64
End: 2019-04-02
Payer: MEDICARE

## 2019-04-02 VITALS
TEMPERATURE: 98 F | HEIGHT: 63 IN | DIASTOLIC BLOOD PRESSURE: 78 MMHG | BODY MASS INDEX: 44.83 KG/M2 | WEIGHT: 253 LBS | SYSTOLIC BLOOD PRESSURE: 128 MMHG | HEART RATE: 72 BPM

## 2019-04-02 DIAGNOSIS — Z79.899 HIGH RISK MEDICATION USE: ICD-10-CM

## 2019-04-02 DIAGNOSIS — Z13.220 NEED FOR LIPID SCREENING: Primary | ICD-10-CM

## 2019-04-02 DIAGNOSIS — D68.62 LUPUS ANTICOAGULANT DISORDER: ICD-10-CM

## 2019-04-02 DIAGNOSIS — E53.8 VITAMIN B 12 DEFICIENCY: ICD-10-CM

## 2019-04-02 DIAGNOSIS — R29.6 FALLING EPISODES: ICD-10-CM

## 2019-04-02 DIAGNOSIS — Z94.0 KIDNEY TRANSPLANT RECIPIENT: ICD-10-CM

## 2019-04-02 DIAGNOSIS — M54.42 CHRONIC MIDLINE LOW BACK PAIN WITH LEFT-SIDED SCIATICA: ICD-10-CM

## 2019-04-02 DIAGNOSIS — G89.29 CHRONIC MIDLINE LOW BACK PAIN WITH LEFT-SIDED SCIATICA: ICD-10-CM

## 2019-04-02 DIAGNOSIS — G89.29 CHRONIC LEFT HIP PAIN: ICD-10-CM

## 2019-04-02 DIAGNOSIS — L60.3 BRITTLE NAILS: ICD-10-CM

## 2019-04-02 DIAGNOSIS — E55.9 VITAMIN D DEFICIENCY: ICD-10-CM

## 2019-04-02 DIAGNOSIS — M25.552 CHRONIC LEFT HIP PAIN: ICD-10-CM

## 2019-04-02 PROCEDURE — 99213 PR OFFICE/OUTPT VISIT, EST, LEVL III, 20-29 MIN: ICD-10-PCS | Mod: S$GLB,,, | Performed by: NURSE PRACTITIONER

## 2019-04-02 PROCEDURE — 99213 OFFICE O/P EST LOW 20 MIN: CPT | Mod: S$GLB,,, | Performed by: NURSE PRACTITIONER

## 2019-04-02 NOTE — PROGRESS NOTES
Subjective:       Patient ID: Alida Chacon is a 63 y.o. female.    Chief Complaint: Follow-up  62 y/o female new to this provider presents for 3 mo f/u. She has an extensive PMH kidney transplant, lupus anticoagulant disorder etc. Chronic left hip pain seen by Dr. Masood vazquez with AVN referred to WERO vazquez left hip greater trochanteric bursitis and LBP without radiculopathy given a brace.  She is intolerant to steroid injections with h/o angioedema reaction.  She is under the care of pain management with recent dose increase. She is crying stating pain is so severe, 3 falls in 2 weeks and affecting her quality of life.  Reports h/o ER and IR high dose pain medication that she has weaned her self off and now takes only PRN. Her INR is therapeutic, home testing done records in chart.  She will see nephrology next with blood work to be done, copy requested. Due for routine labs.         Past Medical History:   Diagnosis Date    Arthritis     djd, problem lying flat    Cataract     OS    Chronic pancreatitis     Encounter for blood transfusion     Hives due to cold exposure     Hypertension     Lupus     Medication intolerance     taking prilosec for the prevention after transplant    Seasonal allergies     Sleep apnea     Stroke 2005    Thyroid disease     parathyroid disease       Past Surgical History:   Procedure Laterality Date    BREAST BIOPSY Bilateral 2004    BREAST LUMPECTOMY      BREAST SURGERY      biopsy x 3    CATARACT EXTRACTION Right 01/18/2017    [id number] 19.0D.//    CHOLECYSTECTOMY      dialysis graft      all nonfuctional    HERNIA REPAIR      hiatal hernia repair    KIDNEY TRANSPLANT Right     MRI (Magnetic Resonance Imagine) N/A 11/6/2018    Performed by [facility]ene Surgeon at [facility]    phaco/pciol Right 1/18/2017    Performed by Steffanie Caraballo MD at [facility] OR    STOMACH SURGERY      ulcer repair        Social History     Socioeconomic History    Marital status:       Spouse name: Not on file    Number of children: Not on file    Years of education: Not on file    Highest education level: Not on file   Occupational History    Not on file   Social Needs    Financial resource strain: Not on file    Food insecurity:     Worry: Not on file     Inability: Not on file    Transportation needs:     Medical: Not on file     Non-medical: Not on file   Tobacco Use    Smoking status: Never Smoker    Smokeless tobacco: Never Used   Substance and Sexual Activity    Alcohol use: No    Drug use: No    Sexual activity: Never   Lifestyle    Physical activity:     Days per week: Not on file     Minutes per session: Not on file    Stress: Not on file   Relationships    Social connections:     Talks on phone: Not on file     Gets together: Not on file     Attends Advent service: Not on file     Active member of club or organization: Not on file     Attends meetings of clubs or organizations: Not on file     Relationship status: Not on file    Intimate partner violence:     Fear of current or ex partner: Not on file     Emotionally abused: Not on file     Physically abused: Not on file     Forced sexual activity: Not on file   Other Topics Concern    Not on file   Social History Narrative    Not on file       Review of patient's allergies indicates:   Allergen Reactions    Adhesive tape-silicones Blisters     TEGADERM IS OKAY    Bee venom protein (honey bee) Anaphylaxis    Cat/feline products Shortness Of Breath    Cytoxan [cyclophosphamide] Anaphylaxis    Penicillins Anaphylaxis    Shellfish containing products Anaphylaxis, Hives, Itching, Photosensitivity, Rash, Shortness Of Breath and Swelling    Sulfa (sulfonamide antibiotics) Anaphylaxis    Tetracyclines Anaphylaxis    Compazine [prochlorperazine edisylate] Hives    Lasix [furosemide] Hives    Morphine Other (See Comments)     Pt reports severe headache    Versed [midazolam] Other (See Comments)     agitation     Vicodin [hydrocodone-acetaminophen] Other (See Comments)     Pt reports having severe headaches     Cortisone      Angioedema with an injectible    Erythromycin Hives, Itching and Swelling    Erythromycin base     Preservatives [preservative]      Angioedema          Current Outpatient Medications:     alendronate (FOSAMAX) 70 MG tablet, TK 1 T PO ONCE WEEKLY, Disp: 12 tablet, Rfl: 3    calcium-magnesium 300-300 mg Tab, Calcium Magnesium, Disp: , Rfl:     cetirizine (ZYRTEC) 10 MG tablet, Take 1 tablet (10 mg total) by mouth daily as needed for Allergies., Disp: 90 tablet, Rfl: 3    ciprofloxacin-dexamethasone 0.3-0.1% (CIPRODEX) 0.3-0.1 % DrpS, na, Disp: 7.5 mL, Rfl: 0    CREON 24,000-76,000 -120,000 unit capsule, Take 2 capsules by mouth three times daily with food., Disp: 540 capsule, Rfl: 3    docusate sodium (COLACE) 100 MG capsule, Take 100 mg by mouth 2 (two) times daily., Disp: , Rfl:     ergocalciferol (ERGOCALCIFEROL) 50,000 unit Cap, TK 1 C PO  Q WEEKLY, Disp: 12 capsule, Rfl: 9    ethacrynic acid (EDECRIN) 25 mg Tab, Take 25 mg by mouth 2 (two) times daily as needed. , Disp: , Rfl:     irbesartan (AVAPRO) 150 MG tablet, Take 1 tablet (150 mg total) by mouth once daily., Disp: 90 tablet, Rfl: 3    LINZESS 290 mcg Cap, , Disp: , Rfl:     magnesium oxide (MAG-OX) 400 mg tablet, Take 400 mg by mouth once daily., Disp: , Rfl:     naloxone (NARCAN) 4 mg/actuation Spry, Narcan 4 mg/actuation nasal spray  Take by nasal route., Disp: , Rfl:     omeprazole (PRILOSEC) 40 MG capsule, TK 1 C PO  QD, Disp: 90 capsule, Rfl: 3    ondansetron (ZOFRAN) 4 MG tablet, Take 2 tablets (8 mg total) by mouth 2 (two) times daily., Disp: 180 tablet, Rfl: 3    oxycodone (ROXICODONE) 15 MG Tab, TK 1 T PO QID PRN, Disp: , Rfl: 0    OXYGEN-AIR DELIVERY SYSTEMS MISC, 2 L by Nasal route nightly as needed., Disp: , Rfl:     predniSONE (DELTASONE) 5 MG tablet, Take 1 tablet (5 mg total) by mouth once daily., Disp:  90 tablet, Rfl: 3    tacrolimus (PROGRAF) 1 MG Cap, Take 3 capsules (3 mg total) by mouth every 12 (twelve) hours., Disp: 540 capsule, Rfl: 1    traZODone (DESYREL) 50 MG tablet, Take 1 tablet (50 mg total) by mouth every evening., Disp: 90 tablet, Rfl: 0    triazolam (HALCION) 0.25 MG Tab, Take 1 tablet (0.25 mg total) by mouth nightly as needed., Disp: 30 tablet, Rfl: 2    warfarin (COUMADIN) 5 MG tablet, Take 5 mg by mouth Daily. , Disp: , Rfl:      Hip Pain    The incident occurred more than 1 week ago. There was no injury mechanism. The pain is present in the left hip. The quality of the pain is described as shooting, stabbing, cramping and aching. The pain has been constant since onset. Associated symptoms include an inability to bear weight, a loss of sensation, muscle weakness and numbness. She reports no foreign bodies present. The symptoms are aggravated by movement, palpation and weight bearing. She has tried acetaminophen, heat, immobilization, ice, non-weight bearing and rest for the symptoms. The treatment provided no relief.     Review of Systems   Constitutional: Negative for chills, diaphoresis, fever and unexpected weight change.   HENT: Negative for dental problem and trouble swallowing.    Eyes: Negative for visual disturbance.   Respiratory: Negative for shortness of breath and wheezing.    Cardiovascular: Negative for chest pain and palpitations.   Gastrointestinal: Negative for abdominal pain, constipation, diarrhea, nausea and vomiting.        RLQ pain self resolved   Endocrine: Negative for polydipsia and polyuria.   Genitourinary: Negative for dysuria.   Musculoskeletal: Negative for joint swelling.        Chronic left hip pain   Skin: Negative for rash.        Rash on scalp   Neurological: Positive for numbness. Negative for syncope and headaches.   Psychiatric/Behavioral: Negative for agitation and confusion.       Objective:      Physical Exam   Constitutional: She is oriented to  person, place, and time. She appears well-developed and well-nourished.   HENT:   Head: Normocephalic.   Eyes: Pupils are equal, round, and reactive to light. Conjunctivae are normal.   Neck: Normal range of motion. Neck supple. No thyromegaly present.   Cardiovascular: Normal rate, regular rhythm and normal heart sounds.   No murmur heard.  Pulmonary/Chest: Effort normal and breath sounds normal. She has no wheezes. She has no rales.   Abdominal: Soft. Bowel sounds are normal. She exhibits no distension and no mass. There is no tenderness. There is no guarding.   No notable mass in RLQ   Musculoskeletal: Normal range of motion. She exhibits no edema.   reproducible right hip pain   Neurological: She is alert and oriented to person, place, and time.   Skin: Skin is warm and dry. Capillary refill takes less than 2 seconds.   Scalp erythema    Psychiatric: She has a normal mood and affect. Judgment and thought content normal.   Vitals reviewed.      Assessment:       1. Chronic left hip pain    2. Kidney transplant recipient    3. Lupus anticoagulant disorder        Plan:     1- recommend follow up with Dr. Correia  2- fasting labs to be done with nephrology labs     Risks, benefits, and side effects were discussed with the patient. All questions were answered to the fullest satisfaction of the patient, and pt verbalized understanding and agreement to treatment plan. Pt was to call with any new or worsening symptoms, or present to the ER.

## 2019-04-03 ENCOUNTER — TELEPHONE (OUTPATIENT)
Dept: ORTHOPEDICS | Facility: CLINIC | Age: 64
End: 2019-04-03

## 2019-04-03 NOTE — TELEPHONE ENCOUNTER
Called patient to schedule appointment as requested by patient for treatment of left hip pain.     Request states:   Appointment Request From: Alida Chacon      With Provider: Brandon Correia DO [[facility]]      Preferred Date Range: 4/3/2019 - 5/2/2019      Preferred Times: Monday Afternoon, Tuesday Afternoon, Wednesday Afternoon, Thursday Afternoon, Friday Afternoon      Reason for visit: follow up on L hip      Comments:   Minal Bedolla NP suggesedt to follow up due to extreme l hip pain, multiple falls, difficulty with balance and walking     Appointment made and patient voiced understanding of appointment date, time, and location.

## 2019-04-04 DIAGNOSIS — M25.559 ARTHRALGIA OF HIP, UNSPECIFIED LATERALITY: Primary | ICD-10-CM

## 2019-04-08 ENCOUNTER — HOSPITAL ENCOUNTER (OUTPATIENT)
Dept: RADIOLOGY | Facility: HOSPITAL | Age: 64
Discharge: HOME | End: 2019-04-08
Attending: ORTHOPAEDIC SURGERY
Payer: MEDICARE

## 2019-04-08 ENCOUNTER — TELEPHONE (OUTPATIENT)
Dept: ORTHOPEDICS | Facility: CLINIC | Age: 64
End: 2019-04-08

## 2019-04-08 ENCOUNTER — OFFICE VISIT (OUTPATIENT)
Dept: ORTHOPEDICS | Facility: CLINIC | Age: 64
End: 2019-04-08
Payer: MEDICARE

## 2019-04-08 DIAGNOSIS — M25.559 ARTHRALGIA OF HIP, UNSPECIFIED LATERALITY: ICD-10-CM

## 2019-04-08 DIAGNOSIS — M54.10 RADICULAR PAIN OF BOTH LOWER EXTREMITIES: ICD-10-CM

## 2019-04-08 DIAGNOSIS — M87.052 ASEPTIC NECROSIS OF HEAD OF LEFT FEMUR: ICD-10-CM

## 2019-04-08 DIAGNOSIS — M70.62 GREATER TROCHANTERIC BURSITIS, LEFT: Primary | ICD-10-CM

## 2019-04-08 DIAGNOSIS — M17.0 PRIMARY OSTEOARTHRITIS OF BOTH KNEES: ICD-10-CM

## 2019-04-08 PROCEDURE — 99999 PR PBB SHADOW E&M-EST. PATIENT-LVL II: CPT | Mod: PBBFAC,,, | Performed by: ORTHOPAEDIC SURGERY

## 2019-04-08 PROCEDURE — 99999 PR PBB SHADOW E&M-EST. PATIENT-LVL II: ICD-10-PCS | Mod: PBBFAC,,, | Performed by: ORTHOPAEDIC SURGERY

## 2019-04-08 PROCEDURE — 73521 X-RAY EXAM HIPS BI 2 VIEWS: CPT | Mod: TC,FY

## 2019-04-08 PROCEDURE — 73521 XR HIPS BILATERAL 2 VIEW INCL AP PELVIS: ICD-10-PCS | Mod: 26,,, | Performed by: RADIOLOGY

## 2019-04-08 PROCEDURE — 99213 OFFICE O/P EST LOW 20 MIN: CPT | Mod: S$PBB,,, | Performed by: ORTHOPAEDIC SURGERY

## 2019-04-08 PROCEDURE — 99213 PR OFFICE/OUTPT VISIT, EST, LEVL III, 20-29 MIN: ICD-10-PCS | Mod: S$PBB,,, | Performed by: ORTHOPAEDIC SURGERY

## 2019-04-08 PROCEDURE — 73521 X-RAY EXAM HIPS BI 2 VIEWS: CPT | Mod: 26,,, | Performed by: RADIOLOGY

## 2019-04-08 PROCEDURE — 99212 OFFICE O/P EST SF 10 MIN: CPT | Mod: PBBFAC,25 | Performed by: ORTHOPAEDIC SURGERY

## 2019-04-08 NOTE — PROGRESS NOTES
Subjective:      Patient ID: Alida Chacon is a 63 y.o. female.    Chief Complaint: Pain of the Left Hip      HPI:  Ms. Chacon returns today for follow-up on left hip pain. At her last visit on 07/20/2018 she was forwarded to Spine surgery because she appeared to have radicular component to her left hip pain. When asked where her hip pain is she points to her buttocks.  She denied groin pain. She was seen by spine surgery and the patient has a spondylolisthesis with a potential pars defect and C4/5 spinal stenosis with facet hypertrophy.  She has not followed up with her spine surgeon since 11/08/2018.  She stated that when she drives a car her left leg goes numb.  She also has pain in her hip which she stated she feels a pop and it causes pain. She is currently ambulating with a cane.  She stated she can't take steroid injections as she had an anaphylactic reaction.  She also stated she was going to physical therapy which is not helping.    ROS:  No new diagnosis/surgery/prescriptions since last office visit on 07/20/2019.  Constitution: Negative for chills and fever.   HENT: Negative for hoarse voice.    Eyes: Negative for double vision.   Cardiovascular: Negative for chest pain.   Respiratory: Negative for cough.    Endocrine: Negative for polydipsia.   Hematologic/Lymphatic: Bruises/bleeds easily.   Skin: Positive for poor wound healing.   Musculoskeletal: Positive for back pain, joint pain and muscle weakness.   Gastrointestinal: Negative for constipation and diarrhea.   Genitourinary: Negative for flank pain.   Neurological: Positive for headaches and numbness.   Psychiatric/Behavioral: Negative for depression and substance abuse.       Objective:      Physical Exam:   General:  A histrionic individual with pain out of proportion, AAOx3.  No acute distress  Vascular:  Pulses intact and equal bilaterally.  Capillary refill less than 3 seconds and equal bilaterally  Neurologic:  Pinprick and soft touch  intact and equal bilaterally.  Positive straight leg raising.  Integment:  No ecchymosis, no errythema  Extremity:  Hip:  Patient ambulates with an antalgic gait. Flexion/extension equal bilaterally.  Internal/external rotation equal bilaterally. Tender with palpation greater trochanter left hip. No groin tenderness with palpation.  Radiography:  Personally reviewed x-rays of both hips and AP pelvis completed on 04/08/2019 showed surgical clips within the left hemipelvis and arthritic changes of both hips with early AVN of the left hip and a ruffled border over the left greater trochanter.        Assessment:       Impression:   1.  Left greater trochanteric bursitis.  2.  Early AVN left hip.  3.  Radicular lower extremity pain.  4.  DJD bilateral hips      Plan:       1.  Discussed physical examination and radiographic findings with the patient. Alida understands that she has radicular problem with low back issues which is radiating down her legs to her feet.  She also has a greater trochanteric bursitis.  The patient was rude and yelled at me during the exam at this point the patient would be better served bed being evaluated by another orthopedic surgeon she will be referred for a a 2nd opinion.  2.  Referred to Dr. Santa for a 2nd opinion on patient's hip issues.  3.  All pain meds per the patient's PCM.  4.  Continue with physical therapy.  5.  Continue with home exercises.  6.  [facility] portal was discussed with the patient and information was given.  The patient was encouraged to use the portal for future encounters.  7.  Follow up p.r.n..

## 2019-04-08 NOTE — TELEPHONE ENCOUNTER
Obtained verbal verification from Radha at Dr. Santa's office that referral fax was received and that she will call patient to schedule an appointment with Dr. Santa.     Dr. Santa's fax: [phone]  Dr. Santa's phone: [phone]

## 2019-04-17 ENCOUNTER — LAB VISIT (OUTPATIENT)
Dept: LAB | Facility: HOSPITAL | Age: 64
End: 2019-04-17
Attending: NURSE PRACTITIONER
Payer: MEDICARE

## 2019-04-17 DIAGNOSIS — E53.8 VITAMIN B 12 DEFICIENCY: ICD-10-CM

## 2019-04-17 DIAGNOSIS — Z13.220 NEED FOR LIPID SCREENING: ICD-10-CM

## 2019-04-17 DIAGNOSIS — R29.6 FALLING EPISODES: ICD-10-CM

## 2019-04-17 DIAGNOSIS — L60.3 BRITTLE NAILS: ICD-10-CM

## 2019-04-17 DIAGNOSIS — Z79.899 HIGH RISK MEDICATION USE: ICD-10-CM

## 2019-04-17 DIAGNOSIS — Z94.0 KIDNEY TRANSPLANT RECIPIENT: ICD-10-CM

## 2019-04-17 DIAGNOSIS — E55.9 VITAMIN D DEFICIENCY: ICD-10-CM

## 2019-04-17 DIAGNOSIS — D68.62 LUPUS ANTICOAGULANT DISORDER: ICD-10-CM

## 2019-04-17 LAB
25(OH)D3+25(OH)D2 SERPL-MCNC: 37 NG/ML (ref 30–96)
CHOLEST SERPL-MCNC: 179 MG/DL (ref 120–199)
CHOLEST/HDLC SERPL: 2.7 {RATIO} (ref 2–5)
HDLC SERPL-MCNC: 67 MG/DL (ref 40–75)
HDLC SERPL: 37.4 % (ref 20–50)
LDLC SERPL CALC-MCNC: 89.6 MG/DL (ref 63–159)
NONHDLC SERPL-MCNC: 112 MG/DL
T4 FREE SERPL-MCNC: 1.19 NG/DL (ref 0.71–1.51)
TRIGL SERPL-MCNC: 112 MG/DL (ref 30–150)
TSH SERPL DL<=0.005 MIU/L-ACNC: 2.33 UIU/ML (ref 0.34–5.6)
VIT B12 SERPL-MCNC: 309 PG/ML (ref 210–950)

## 2019-04-17 PROCEDURE — 82607 VITAMIN B-12: CPT

## 2019-04-17 PROCEDURE — 84439 ASSAY OF FREE THYROXINE: CPT

## 2019-04-17 PROCEDURE — 80061 LIPID PANEL: CPT

## 2019-04-17 PROCEDURE — 84443 ASSAY THYROID STIM HORMONE: CPT

## 2019-04-17 PROCEDURE — 82306 VITAMIN D 25 HYDROXY: CPT

## 2019-04-17 PROCEDURE — 36415 COLL VENOUS BLD VENIPUNCTURE: CPT

## 2019-05-03 DIAGNOSIS — Z11.59 NEED FOR HEPATITIS C SCREENING TEST: ICD-10-CM

## 2019-05-06 RX ORDER — PREDNISONE 5 MG/1
TABLET ORAL
Qty: 90 TABLET | Refills: 3 | Status: SHIPPED | OUTPATIENT
Start: 2019-05-06 | End: 2020-01-28

## 2019-05-06 RX ORDER — WARFARIN SODIUM 5 MG/1
TABLET ORAL
Qty: 90 TABLET | Refills: 11 | Status: SHIPPED | OUTPATIENT
Start: 2019-05-06 | End: 2020-07-06

## 2019-05-10 RX ORDER — IRBESARTAN 150 MG/1
150 TABLET ORAL DAILY
Qty: 90 TABLET | Refills: 3 | Status: SHIPPED | OUTPATIENT
Start: 2019-05-10 | End: 2019-05-13 | Stop reason: SDUPTHER

## 2019-05-13 RX ORDER — IRBESARTAN 150 MG/1
150 TABLET ORAL DAILY
Qty: 90 TABLET | Refills: 3 | Status: SHIPPED | OUTPATIENT
Start: 2019-05-13 | End: 2020-09-09 | Stop reason: SDUPTHER

## 2019-06-08 RX ORDER — IRBESARTAN 150 MG/1
TABLET ORAL
Qty: 90 TABLET | Refills: 3 | Status: SHIPPED | OUTPATIENT
Start: 2019-06-08 | End: 2019-07-01 | Stop reason: SDUPTHER

## 2019-06-18 ENCOUNTER — PATIENT OUTREACH (OUTPATIENT)
Dept: ADMINISTRATIVE | Facility: HOSPITAL | Age: 64
End: 2019-06-18

## 2019-06-19 ENCOUNTER — TELEPHONE (OUTPATIENT)
Dept: ADMINISTRATIVE | Facility: HOSPITAL | Age: 64
End: 2019-06-19

## 2019-06-28 RX ORDER — PANCRELIPASE 24000; 76000; 120000 [USP'U]/1; [USP'U]/1; [USP'U]/1
CAPSULE, DELAYED RELEASE PELLETS ORAL
Qty: 540 CAPSULE | Refills: 3 | Status: SHIPPED | OUTPATIENT
Start: 2019-06-28 | End: 2019-08-01 | Stop reason: SDUPTHER

## 2019-07-01 ENCOUNTER — OFFICE VISIT (OUTPATIENT)
Dept: FAMILY MEDICINE | Facility: CLINIC | Age: 64
End: 2019-07-01
Payer: MEDICARE

## 2019-07-01 VITALS
SYSTOLIC BLOOD PRESSURE: 125 MMHG | DIASTOLIC BLOOD PRESSURE: 70 MMHG | OXYGEN SATURATION: 95 % | RESPIRATION RATE: 20 BRPM | BODY MASS INDEX: 45.2 KG/M2 | WEIGHT: 255.13 LBS | HEART RATE: 64 BPM | HEIGHT: 63 IN

## 2019-07-01 DIAGNOSIS — Z94.0 KIDNEY TRANSPLANT RECIPIENT: Primary | ICD-10-CM

## 2019-07-01 DIAGNOSIS — D17.1 LIPOMA OF BACK: ICD-10-CM

## 2019-07-01 DIAGNOSIS — D68.62 LUPUS ANTICOAGULANT DISORDER: ICD-10-CM

## 2019-07-01 PROCEDURE — 99999 PR PBB SHADOW E&M-EST. PATIENT-LVL V: CPT | Mod: PBBFAC,,, | Performed by: FAMILY MEDICINE

## 2019-07-01 PROCEDURE — 99999 PR PBB SHADOW E&M-EST. PATIENT-LVL V: ICD-10-PCS | Mod: PBBFAC,,, | Performed by: FAMILY MEDICINE

## 2019-07-01 PROCEDURE — 99215 OFFICE O/P EST HI 40 MIN: CPT | Mod: PBBFAC,PN | Performed by: FAMILY MEDICINE

## 2019-07-01 PROCEDURE — 99214 OFFICE O/P EST MOD 30 MIN: CPT | Mod: S$PBB,,, | Performed by: FAMILY MEDICINE

## 2019-07-01 PROCEDURE — 99214 PR OFFICE/OUTPT VISIT, EST, LEVL IV, 30-39 MIN: ICD-10-PCS | Mod: S$PBB,,, | Performed by: FAMILY MEDICINE

## 2019-07-01 RX ORDER — OXYCODONE HYDROCHLORIDE 20 MG/1
TABLET ORAL
COMMUNITY
Start: 2019-06-28 | End: 2020-01-10

## 2019-07-01 RX ORDER — EPINEPHRINE 0.15 MG/.3ML
0.15 INJECTION INTRAMUSCULAR
Qty: 2 EACH | Refills: 2 | Status: SHIPPED | OUTPATIENT
Start: 2019-07-01 | End: 2021-12-06 | Stop reason: SDUPTHER

## 2019-07-01 NOTE — PROGRESS NOTES
"Subjective:       Patient ID: Alida Chacon is a 63 y.o. female.    Chief Complaint: Follow-up    Follow up    Recurrent lipomas  Larger  Affecting quality of life    Patient reports that her pain is well controlled, and that medication is preservingher quality of life. Patient requests refill today, and denies any change in her pain. No abuse concerns.  reviewed.      Review of Systems   Constitutional: Positive for fatigue. Negative for activity change, appetite change, chills and fever.   HENT: Negative for congestion, dental problem, facial swelling, nosebleeds, postnasal drip, sinus pain, sore throat, trouble swallowing and voice change.    Eyes: Negative for pain, discharge and visual disturbance.   Respiratory: Negative for apnea, cough, chest tightness and shortness of breath.    Cardiovascular: Negative for chest pain and palpitations.   Gastrointestinal: Negative for abdominal pain, blood in stool, constipation and nausea.   Endocrine: Negative for cold intolerance, polydipsia and polyuria.   Genitourinary: Negative for difficulty urinating, enuresis and flank pain.   Musculoskeletal: Positive for arthralgias, back pain and gait problem.   Skin: Negative for color change.   Allergic/Immunologic: Negative for environmental allergies and immunocompromised state.   Neurological: Negative for dizziness and light-headedness.   Hematological: Negative for adenopathy.   Psychiatric/Behavioral: Negative for agitation, behavioral problems, decreased concentration and dysphoric mood. The patient is not nervous/anxious.    All other systems reviewed and are negative.        Reviewed family, medical, surgical, and social history.    Objective:      /70 (BP Location: Left arm, Patient Position: Sitting, BP Method: Large (Automatic))   Pulse 64   Resp 20   Ht 5' 3" (1.6 m)   Wt 115.7 kg (255 lb 1.6 oz)   SpO2 95%   BMI 45.19 kg/m²   Physical Exam   Constitutional: She is oriented to person, place, " and time. She appears well-developed and well-nourished. No distress.   HENT:   Head: Normocephalic and atraumatic.   Nose: Nose normal.   Mouth/Throat: Oropharynx is clear and moist. No oropharyngeal exudate.   Eyes: Pupils are equal, round, and reactive to light. Conjunctivae and EOM are normal. No scleral icterus.   Neck: Normal range of motion. Neck supple. No thyromegaly present.   Cardiovascular: Normal rate, regular rhythm and normal heart sounds. Exam reveals no gallop and no friction rub.   No murmur heard.  Pulmonary/Chest: Effort normal and breath sounds normal. No respiratory distress. She has no wheezes. She has no rales. She exhibits no tenderness.   Abdominal: Soft. Bowel sounds are normal. She exhibits no distension. There is no tenderness. There is no guarding.   Musculoskeletal: Normal range of motion. She exhibits tenderness and deformity. She exhibits no edema.   Lymphadenopathy:     She has no cervical adenopathy.   Neurological: She is alert and oriented to person, place, and time. She displays normal reflexes. No cranial nerve deficit or sensory deficit. She exhibits normal muscle tone.   Skin: Skin is warm and dry. No rash noted. She is not diaphoretic. No erythema. No pallor.   Psychiatric: She has a normal mood and affect. Her behavior is normal. Judgment and thought content normal.   Nursing note and vitals reviewed.      Assessment:       1. Kidney transplant recipient    2. Lupus anticoagulant disorder    3. Lipoma of back        Plan:       Kidney transplant recipient    Lupus anticoagulant disorder    Lipoma of back  -     Ambulatory referral to General Surgery    Other orders  -     EPINEPHrine (EPIPEN JR) 0.15 mg/0.3 mL pen injection; Inject 0.3 mLs (0.15 mg total) into the muscle as needed for Anaphylaxis.  Dispense: 2 each; Refill: 2            Risks, benefits, and side effects were discussed with the patient. All questions were answered to the fullest satisfaction of the patient,  and pt verbalized understanding and agreement to treatment plan. Pt was to call with any new or worsening symptoms, or present to the ER.

## 2019-07-30 ENCOUNTER — PATIENT MESSAGE (OUTPATIENT)
Dept: FAMILY MEDICINE | Facility: CLINIC | Age: 64
End: 2019-07-30

## 2019-07-31 ENCOUNTER — PATIENT MESSAGE (OUTPATIENT)
Dept: FAMILY MEDICINE | Facility: CLINIC | Age: 64
End: 2019-07-31

## 2019-07-31 DIAGNOSIS — Z94.0 KIDNEY TRANSPLANT RECIPIENT: ICD-10-CM

## 2019-07-31 DIAGNOSIS — M79.671 RIGHT FOOT PAIN: Primary | ICD-10-CM

## 2019-07-31 RX ORDER — TACROLIMUS 1 MG/1
3 CAPSULE ORAL EVERY 12 HOURS
Qty: 540 CAPSULE | Refills: 1 | Status: SHIPPED | OUTPATIENT
Start: 2019-07-31 | End: 2019-08-01 | Stop reason: SDUPTHER

## 2019-07-31 NOTE — TELEPHONE ENCOUNTER
Spoke with pt, she is requesting an xray of her right foot and ankle, stating that the pain has not gotten any better since discussed at her last office visit. X-rays orders have been placed and pend.  Please advise.

## 2019-08-01 ENCOUNTER — PATIENT MESSAGE (OUTPATIENT)
Dept: FAMILY MEDICINE | Facility: CLINIC | Age: 64
End: 2019-08-01

## 2019-08-01 ENCOUNTER — OFFICE VISIT (OUTPATIENT)
Dept: SURGERY | Facility: CLINIC | Age: 64
End: 2019-08-01
Attending: FAMILY MEDICINE
Payer: MEDICARE

## 2019-08-01 ENCOUNTER — TELEPHONE (OUTPATIENT)
Dept: FAMILY MEDICINE | Facility: CLINIC | Age: 64
End: 2019-08-01

## 2019-08-01 ENCOUNTER — HOSPITAL ENCOUNTER (OUTPATIENT)
Dept: RADIOLOGY | Facility: HOSPITAL | Age: 64
Discharge: HOME | End: 2019-08-01
Attending: FAMILY MEDICINE
Payer: MEDICARE

## 2019-08-01 VITALS
WEIGHT: 257 LBS | DIASTOLIC BLOOD PRESSURE: 83 MMHG | HEIGHT: 63 IN | SYSTOLIC BLOOD PRESSURE: 158 MMHG | RESPIRATION RATE: 18 BRPM | BODY MASS INDEX: 45.54 KG/M2 | TEMPERATURE: 98 F | HEART RATE: 78 BPM | OXYGEN SATURATION: 95 %

## 2019-08-01 DIAGNOSIS — Z94.0 KIDNEY TRANSPLANT RECIPIENT: ICD-10-CM

## 2019-08-01 DIAGNOSIS — M79.671 RIGHT FOOT PAIN: ICD-10-CM

## 2019-08-01 DIAGNOSIS — M79.89 SOFT TISSUE MASS: Primary | ICD-10-CM

## 2019-08-01 PROCEDURE — 73610 XR ANKLE COMPLETE 3 VIEW RIGHT: ICD-10-PCS | Mod: 26,RT,, | Performed by: RADIOLOGY

## 2019-08-01 PROCEDURE — 73630 X-RAY EXAM OF FOOT: CPT | Mod: TC,FY,RT

## 2019-08-01 PROCEDURE — 73610 X-RAY EXAM OF ANKLE: CPT | Mod: TC,FY,RT

## 2019-08-01 PROCEDURE — 73630 XR FOOT COMPLETE 3 VIEW RIGHT: ICD-10-PCS | Mod: 26,RT,, | Performed by: RADIOLOGY

## 2019-08-01 PROCEDURE — 73610 X-RAY EXAM OF ANKLE: CPT | Mod: 26,RT,, | Performed by: RADIOLOGY

## 2019-08-01 PROCEDURE — 99203 PR OFFICE/OUTPT VISIT, NEW, LEVL III, 30-44 MIN: ICD-10-PCS | Mod: S$GLB,,, | Performed by: SURGERY

## 2019-08-01 PROCEDURE — 99203 OFFICE O/P NEW LOW 30 MIN: CPT | Mod: S$GLB,,, | Performed by: SURGERY

## 2019-08-01 PROCEDURE — 73630 X-RAY EXAM OF FOOT: CPT | Mod: 26,RT,, | Performed by: RADIOLOGY

## 2019-08-01 RX ORDER — TACROLIMUS 1 MG/1
3 CAPSULE ORAL EVERY 12 HOURS
Qty: 540 CAPSULE | Refills: 1 | Status: CANCELLED | OUTPATIENT
Start: 2019-08-01 | End: 2020-07-31

## 2019-08-01 RX ORDER — TACROLIMUS 1 MG/1
3 CAPSULE ORAL EVERY 12 HOURS
Qty: 540 CAPSULE | Refills: 1 | Status: SHIPPED | OUTPATIENT
Start: 2019-08-01 | End: 2020-01-10 | Stop reason: SDUPTHER

## 2019-08-01 NOTE — LETTER
August 1, 2019      Lavell Moreno MD  [address]  [place] [address]           [facility]  [address] [address] [facility] [place] [address]  Phone: [phone]  Fax: [phone]          Patient: Alida Chacon   MR Number: [id number]   YOB: 1955   Date of Visit: 8/1/2019       Dear Dr. Lavell Moreno:    Thank you for referring Alida Chacon to me for evaluation. Attached you will find relevant portions of my assessment and plan of care.    If you have questions, please do not hesitate to call me. I look forward to following Alida Chacon along with you.    Sincerely,    River Mccollum MD    Enclosure  CC:  No Recipients    If you would like to receive this communication electronically, please contact [email or web address] or [phone] to request more information on [email or web address] Link access.    For providers and/or their staff who would like to refer a patient to [facility], please contact us through our one-stop-shop provider referral line, [facility], at [phone].    If you feel you have received this communication in error or would no longer like to receive these types of communications, please e-mail [email or web address]

## 2019-08-01 NOTE — TELEPHONE ENCOUNTER
----- Message from Ciro MEJIAS Gisella sent at 8/1/2019  1:09 PM CDT -----  Contact: same  Patient called in and stated her Rx for tacrolimus (PROGRAF) 1 MG Cap was sent to the wrong pharmacy.  Please resend this Rx to:    [id number] - [place], [place] [address] [address]  [address] [address]  Phone: [phone] Fax: [phone]    Patient call back number is [phone].  Please call patient when this is done.

## 2019-08-01 NOTE — TELEPHONE ENCOUNTER
I have spoken with Priyanka, pharmacist at [facility].  They have received the Prograf, however, they do not carry it and they will send it out to an appropriate pharmacy.  She will notify corey Rhoades

## 2019-08-01 NOTE — PROGRESS NOTES
Subjective:       Patient ID: Alida Chacon is a 63 y.o. female.    Chief Complaint: Consult (Referral-Josh-Lipoma of back)      HPI:  Ms. Chacon presents today as a consult from Dr. Lavell Moreno.  She was referred for a subcutaneous mass of the posterior lateral chest wall.  Mass has been present for at least a year but has recently become tender.  There has been no noted increase in size.  No history of trauma.  No aggravating factors.  No alleviating factors.  No fevers, chills, night sweats, weight loss, etc.  She is the recipient of a cadaver kidney transplant.  She also has lupus anticoagulant hypercoagulability and is on Coumadin.  She describes the discomfort as mild and dull.  Discomfort is related to clothing or pressure applied to the mass.      Allergies & Meds:  Review of patient's allergies indicates:   Allergen Reactions    Adhesive tape-silicones Blisters     TEGADERM IS OKAY    Bee venom protein (honey bee) Anaphylaxis    Cat/feline products Shortness Of Breath    Cytoxan [cyclophosphamide] Anaphylaxis    Iodinated contrast- oral and iv dye Anaphylaxis    Penicillins Anaphylaxis    Shellfish containing products Anaphylaxis, Hives, Itching, Photosensitivity, Rash, Shortness Of Breath and Swelling    Sulfa (sulfonamide antibiotics) Anaphylaxis    Tetracyclines Anaphylaxis    Compazine [prochlorperazine edisylate] Hives    Lasix [furosemide] Hives    Morphine Other (See Comments)     Pt reports severe headache    Versed [midazolam] Other (See Comments)     agitation    Vicodin [hydrocodone-acetaminophen] Other (See Comments)     Pt reports having severe headaches     Benazepril     Cortisone      Angioedema with an injectible    Erythromycin Hives, Itching and Swelling    Erythromycin base     Preservatives [preservative]      Angioedema       Current Outpatient Medications   Medication Sig Dispense Refill    alendronate (FOSAMAX) 70 MG tablet TK 1 T PO ONCE WEEKLY 12 tablet 3     calcium-magnesium 300-300 mg Tab Calcium Magnesium      cetirizine (ZYRTEC) 10 MG tablet Take 1 tablet (10 mg total) by mouth daily as needed for Allergies. 90 tablet 3    ciprofloxacin-dexamethasone 0.3-0.1% (CIPRODEX) 0.3-0.1 % DrpS na 7.5 mL 0    CREON 24,000-76,000 -120,000 unit capsule TAKE 2 CAPSULES BY MOUTH 3 TIMES A DAY WITH FOOD FOR DIGESTION 540 capsule 3    docusate sodium (COLACE) 100 MG capsule Take 100 mg by mouth 2 (two) times daily.      EPINEPHrine (EPIPEN JR) 0.15 mg/0.3 mL pen injection Inject 0.3 mLs (0.15 mg total) into the muscle as needed for Anaphylaxis. 2 each 2    ergocalciferol (ERGOCALCIFEROL) 50,000 unit Cap TK 1 C PO  Q WEEKLY 12 capsule 9    ethacrynic acid (EDECRIN) 25 mg Tab Take 25 mg by mouth 2 (two) times daily as needed.       irbesartan (AVAPRO) 150 MG tablet Take 1 tablet (150 mg total) by mouth once daily. 90 tablet 3    magnesium oxide (MAG-OX) 400 mg tablet Take 400 mg by mouth once daily.      naloxone (NARCAN) 4 mg/actuation Spry Narcan 4 mg/actuation nasal spray   Take by nasal route.      omeprazole (PRILOSEC) 40 MG capsule TK 1 C PO  QD 90 capsule 3    ondansetron (ZOFRAN) 4 MG tablet Take 2 tablets (8 mg total) by mouth 2 (two) times daily. 180 tablet 3    oxyCODONE (ROXICODONE) 20 mg Tab immediate release tablet       OXYGEN-AIR DELIVERY SYSTEMS MISC 2 L by Nasal route nightly as needed.      predniSONE (DELTASONE) 5 MG tablet TAKE 1 TABLET BY MOUTH ONCE DAILY WITH FOOD FOR INFLAMMATION 90 tablet 3    tacrolimus (PROGRAF) 1 MG Cap Take 3 capsules (3 mg total) by mouth every 12 (twelve) hours. 540 capsule 1    warfarin (COUMADIN) 5 MG tablet TAKE 1 TABLET BY MOUTH ONCE DAILY AT 5 PM TO THIN BLOOD 90 tablet 11     No current facility-administered medications for this visit.        PMFSHx:  Past Medical History:   Diagnosis Date    Arthritis     djd, problem lying flat    Cataract     OS    Chronic pancreatitis     Encounter for blood  transfusion     Hives due to cold exposure     Hypertension     Lupus     Medication intolerance     taking prilosec for the prevention after transplant    Seasonal allergies     Sleep apnea     Stroke 2005    Thyroid disease     parathyroid disease       Past Surgical History:   Procedure Laterality Date    BREAST BIOPSY Bilateral 2004    BREAST LUMPECTOMY      BREAST SURGERY      biopsy x 3    CATARACT EXTRACTION Right 01/18/2017    sn60wf 19.0D.//    CHOLECYSTECTOMY      dialysis graft      all nonfuctional    HERNIA REPAIR      hiatal hernia repair    KIDNEY TRANSPLANT Right     lupus nephritis with FSGS    MRI (Magnetic Resonance Imagine) N/A 11/6/2018    [facility]    phaco/pciol Right 1/18/2017    [facility]    STOMACH SURGERY      ulcer repair       Family History   Problem Relation Age of Onset    Cataracts Mother     Hypertension Mother     Diabetes Father     Hypertension Father     Thyroid disease Father     Strabismus Brother     Macular degeneration Maternal Uncle     Amblyopia Neg Hx     Blindness Neg Hx     Cancer Neg Hx     Glaucoma Neg Hx     Retinal detachment Neg Hx     Stroke Neg Hx     Breast cancer Neg Hx        Social History     Tobacco Use    Smoking status: Never Smoker    Smokeless tobacco: Never Used   Substance Use Topics    Alcohol use: No    Drug use: No       Review of Systems   Constitutional: Negative for appetite change, chills, fatigue, fever and unexpected weight change.   HENT: Negative for congestion, dental problem, ear pain, mouth sores, postnasal drip, rhinorrhea, sore throat, tinnitus, trouble swallowing and voice change.    Eyes: Negative for photophobia, pain, discharge and visual disturbance.   Respiratory: Negative for cough, chest tightness, shortness of breath and wheezing.    Cardiovascular: Negative for chest pain, palpitations and leg swelling.    Gastrointestinal: Negative for abdominal pain, blood in stool, constipation, diarrhea, nausea and vomiting.   Endocrine: Negative for cold intolerance, heat intolerance, polydipsia, polyphagia and polyuria.   Genitourinary: Negative for difficulty urinating, dysuria, flank pain, frequency, hematuria and urgency.   Musculoskeletal: Negative for arthralgias, joint swelling and myalgias.   Skin: Negative for color change and rash.   Allergic/Immunologic: Negative for immunocompromised state.   Neurological: Negative for dizziness, tremors, seizures, syncope, speech difficulty, weakness, numbness and headaches.   Hematological: Negative for adenopathy. Does not bruise/bleed easily.   Psychiatric/Behavioral: Negative for agitation, confusion, hallucinations, self-injury and suicidal ideas. The patient is not nervous/anxious.        Objective:      Physical Exam   Constitutional: She is oriented to person, place, and time. She appears well-developed and well-nourished. She is active.  Non-toxic appearance. No distress.   Body mass index is 45.53 kg/m².     HENT:   Head: Normocephalic and atraumatic.   Right Ear: Hearing and external ear normal. No drainage or tenderness.   Left Ear: Hearing and external ear normal. No drainage or tenderness.   Nose: Nose normal. No rhinorrhea. No epistaxis.   Mouth/Throat: Uvula is midline, oropharynx is clear and moist and mucous membranes are normal. Mucous membranes are not pale, not dry and not cyanotic. No oropharyngeal exudate.   Eyes: Pupils are equal, round, and reactive to light. Conjunctivae and lids are normal. Right eye exhibits no discharge and no exudate. Left eye exhibits no discharge and no exudate. Right conjunctiva is not injected. Right eye exhibits no nystagmus. Left eye exhibits no nystagmus.   Neck: Trachea normal, full passive range of motion without pain and phonation normal. No JVD present. Carotid bruit is not present. No tracheal deviation present. No thyroid  mass and no thyromegaly present.   Cardiovascular: Normal rate, regular rhythm, S1 normal, S2 normal, normal heart sounds and intact distal pulses. PMI is not displaced. Exam reveals no gallop and no friction rub.   No murmur heard.  Pulmonary/Chest: Effort normal and breath sounds normal. No accessory muscle usage. No respiratory distress. She exhibits mass (4 x 2 cm slightly tender subcutaneous mass right posterior axillary line overlying the ninth intercostal space). She exhibits no tenderness and no crepitus.   Abdominal: Soft. Normal appearance and bowel sounds are normal. She exhibits no distension, no fluid wave, no abdominal bruit and no mass. There is no hepatosplenomegaly. There is no tenderness. There is no rebound, no guarding and negative Sanchez's sign. No hernia. Hernia confirmed negative in the right inguinal area and confirmed negative in the left inguinal area.   Genitourinary: Rectum normal and vagina normal. There is no rash or lesion on the right labia. There is no rash or lesion on the left labia. Right adnexum displays no mass. Left adnexum displays no mass. No vaginal discharge found.   Musculoskeletal:        Cervical back: Normal.        Thoracic back: Normal.        Lumbar back: Normal.        Right upper arm: Normal.        Left upper arm: Normal.        Right forearm: Normal.        Left forearm: Normal.        Right hand: Normal.        Left hand: Normal.        Right upper leg: Normal.        Left upper leg: Normal.        Right lower leg: Normal.        Left lower leg: Normal.        Right foot: Normal.        Left foot: Normal.   Lymphadenopathy:        Head (right side): No submental, no submandibular and no posterior auricular adenopathy present.        Head (left side): No submental, no submandibular and no posterior auricular adenopathy present.     She has no cervical adenopathy.     She has no axillary adenopathy.        Right: No inguinal and no supraclavicular adenopathy  present.        Left: No inguinal and no supraclavicular adenopathy present.   Neurological: She is alert and oriented to person, place, and time. She has normal strength. No cranial nerve deficit or sensory deficit. Coordination and gait normal. GCS eye subscore is 4. GCS verbal subscore is 5. GCS motor subscore is 6.   Skin: Skin is warm, dry and intact. No rash noted. No cyanosis. Nails show no clubbing.   Psychiatric: She has a normal mood and affect. Her speech is normal. Judgment and thought content normal. Her mood appears not anxious. Her affect is not inappropriate. She is not actively hallucinating. She does not exhibit a depressed mood.             Assessment:       Subcutaneous mass right chest wall suggestive of lipoma, adipose necrosis, or adiposis dolorosa.  Immunosuppressed transplant recipient.  Anticoagulated.  Risks of excision outweigh benefits.    1. Soft tissue mass        Plan:   Soft tissue mass        Follow up for any concerns or questions as needed, resume care with PCP.    Counseling/Medical Decision Making:  Ms. Chacon was counseled regarding the results of her evaluation thus far.  Options discussed included not limited to excision versus observation.  She understands that she is at increased risk for perioperative infection as she is immunosuppressed to prevent transplant rejection.  She also understands that she is at increased risk for bleeding and thromboembolic events as she has a hypercoagulable syndrome and is currently anticoagulated.  Risks are elevated and at this time she does not wish to proceed with excision as her symptoms are tolerable.    Total face-to-face encounter time was 30 minutes with 15 minutes spent counseling the patient as outlined/summarized above.

## 2019-08-02 NOTE — TELEPHONE ENCOUNTER
----- Message from Lavell Moreno MD sent at 8/2/2019  6:58 AM CDT -----  Her xray showed a significant heel spur. Would she like to see our foot specialist?

## 2019-08-28 DIAGNOSIS — H60.62 CHRONIC OTITIS EXTERNA OF LEFT EAR, UNSPECIFIED TYPE: ICD-10-CM

## 2019-08-28 RX ORDER — CIPROFLOXACIN AND DEXAMETHASONE 3; 1 MG/ML; MG/ML
SUSPENSION/ DROPS AURICULAR (OTIC)
Qty: 7.5 ML | Refills: 0 | Status: CANCELLED | OUTPATIENT
Start: 2019-08-28

## 2019-08-30 ENCOUNTER — PATIENT MESSAGE (OUTPATIENT)
Dept: FAMILY MEDICINE | Facility: CLINIC | Age: 64
End: 2019-08-30

## 2019-08-30 DIAGNOSIS — H60.62 CHRONIC OTITIS EXTERNA OF LEFT EAR, UNSPECIFIED TYPE: ICD-10-CM

## 2019-08-30 RX ORDER — CIPROFLOXACIN AND DEXAMETHASONE 3; 1 MG/ML; MG/ML
4 SUSPENSION/ DROPS AURICULAR (OTIC) 2 TIMES DAILY
Qty: 7.5 ML | Refills: 0 | Status: SHIPPED | OUTPATIENT
Start: 2019-08-30 | End: 2020-09-09 | Stop reason: SDUPTHER

## 2019-09-18 ENCOUNTER — PATIENT OUTREACH (OUTPATIENT)
Dept: ADMINISTRATIVE | Facility: HOSPITAL | Age: 64
End: 2019-09-18

## 2019-10-02 ENCOUNTER — OFFICE VISIT (OUTPATIENT)
Dept: FAMILY MEDICINE | Facility: CLINIC | Age: 64
End: 2019-10-02
Payer: MEDICARE

## 2019-10-02 VITALS
RESPIRATION RATE: 16 BRPM | BODY MASS INDEX: 45.8 KG/M2 | WEIGHT: 258.5 LBS | SYSTOLIC BLOOD PRESSURE: 134 MMHG | DIASTOLIC BLOOD PRESSURE: 76 MMHG | HEART RATE: 70 BPM | HEIGHT: 63 IN | OXYGEN SATURATION: 96 %

## 2019-10-02 DIAGNOSIS — R53.83 FATIGUE, UNSPECIFIED TYPE: ICD-10-CM

## 2019-10-02 DIAGNOSIS — D68.62 LUPUS ANTICOAGULANT DISORDER: ICD-10-CM

## 2019-10-02 DIAGNOSIS — L98.9 SKIN LESION: Primary | ICD-10-CM

## 2019-10-02 PROCEDURE — 99999 PR PBB SHADOW E&M-EST. PATIENT-LVL V: CPT | Mod: PBBFAC,,, | Performed by: FAMILY MEDICINE

## 2019-10-02 PROCEDURE — 99215 OFFICE O/P EST HI 40 MIN: CPT | Mod: PBBFAC,PN | Performed by: FAMILY MEDICINE

## 2019-10-02 PROCEDURE — 99213 OFFICE O/P EST LOW 20 MIN: CPT | Mod: S$PBB,,, | Performed by: FAMILY MEDICINE

## 2019-10-02 PROCEDURE — 99999 PR PBB SHADOW E&M-EST. PATIENT-LVL V: ICD-10-PCS | Mod: PBBFAC,,, | Performed by: FAMILY MEDICINE

## 2019-10-02 PROCEDURE — 99213 PR OFFICE/OUTPT VISIT, EST, LEVL III, 20-29 MIN: ICD-10-PCS | Mod: S$PBB,,, | Performed by: FAMILY MEDICINE

## 2019-10-02 RX ORDER — OXYCODONE HYDROCHLORIDE 15 MG/1
TABLET ORAL
COMMUNITY
Start: 2019-09-30 | End: 2020-06-10

## 2019-10-02 NOTE — PROGRESS NOTES
Subjective:       Patient ID: Alida Chacon is a 63 y.o. female.    Chief Complaint: Follow-up (jury duty letter, colonoscopy completed w/Dr. Mena in [place], pt cannot have vaccinations)    Follow up    Recurrent lipomas  Larger  Affecting quality of life    Since the last visit, she has seen general surgery, not a candidate for surgery. Pain controlled with lidocaine patches.    Patient reports that her pain is well controlled, and that medication is preservingher quality of life. Patient requests refill today, and denies any change in her pain. No abuse concerns.  reviewed.      Review of Systems   Constitutional: Positive for fatigue. Negative for activity change, appetite change, chills and fever.   HENT: Negative for congestion, dental problem, facial swelling, nosebleeds, postnasal drip, sinus pain, sore throat, trouble swallowing and voice change.    Eyes: Negative for pain, discharge and visual disturbance.   Respiratory: Negative for apnea, cough, chest tightness and shortness of breath.    Cardiovascular: Negative for chest pain and palpitations.   Gastrointestinal: Negative for abdominal pain, blood in stool, constipation and nausea.   Endocrine: Negative for cold intolerance, polydipsia and polyuria.   Genitourinary: Negative for difficulty urinating, enuresis and flank pain.   Musculoskeletal: Positive for arthralgias, back pain and gait problem.   Skin: Negative for color change.   Allergic/Immunologic: Negative for environmental allergies and immunocompromised state.   Neurological: Negative for dizziness and light-headedness.   Hematological: Negative for adenopathy.   Psychiatric/Behavioral: Negative for agitation, behavioral problems, decreased concentration and dysphoric mood. The patient is not nervous/anxious.    All other systems reviewed and are negative.        Reviewed family, medical, surgical, and social history.    Objective:      /76 (BP Location: Left arm, Patient  "Position: Sitting, BP Method: Large (Automatic))   Pulse 70   Resp 16   Ht 5' 3" (1.6 m)   Wt 117.3 kg (258 lb 8 oz)   SpO2 96%   BMI 45.79 kg/m²   Physical Exam   Constitutional: She is oriented to person, place, and time. She appears well-developed and well-nourished. No distress.   HENT:   Head: Normocephalic and atraumatic.   Nose: Nose normal.   Mouth/Throat: Oropharynx is clear and moist. No oropharyngeal exudate.   Eyes: Pupils are equal, round, and reactive to light. Conjunctivae and EOM are normal. No scleral icterus.   Neck: Normal range of motion. Neck supple. No thyromegaly present.   Cardiovascular: Normal rate, regular rhythm and normal heart sounds. Exam reveals no gallop and no friction rub.   No murmur heard.  Pulmonary/Chest: Effort normal and breath sounds normal. No respiratory distress. She has no wheezes. She has no rales. She exhibits no tenderness.   Abdominal: Soft. Bowel sounds are normal. She exhibits no distension. There is no tenderness. There is no guarding.   Musculoskeletal: Normal range of motion. She exhibits tenderness and deformity. She exhibits no edema.   Lymphadenopathy:     She has no cervical adenopathy.   Neurological: She is alert and oriented to person, place, and time. She displays normal reflexes. No cranial nerve deficit or sensory deficit. She exhibits normal muscle tone.   Skin: Skin is warm and dry. No rash noted. She is not diaphoretic. No erythema. No pallor.   Psychiatric: She has a normal mood and affect. Her behavior is normal. Judgment and thought content normal.   Nursing note and vitals reviewed.      Assessment:       1. Skin lesion    2. Lupus anticoagulant disorder    3. Fatigue, unspecified type        Plan:       Skin lesion  -     Ambulatory referral to Dermatology    Lupus anticoagulant disorder    Fatigue, unspecified type  -     Ambulatory referral/consult to Endocrinology; Future; Expected date: 10/02/2019            Risks, benefits, and side " effects were discussed with the patient. All questions were answered to the fullest satisfaction of the patient, and pt verbalized understanding and agreement to treatment plan. Pt was to call with any new or worsening symptoms, or present to the ER.

## 2019-10-02 NOTE — LETTER
October 2, 2019    Alida Chacon  [address] [place] [address]             [facility] [address] [place] [address]  Phone: [phone]  Fax: [phone] To whom it may concern:    Ms. Chacon is unable to attend jury duty due to her medical problems.        If you have any questions or concerns, please don't hesitate to call.    Sincerely,            Lavell Moreno MD

## 2019-10-28 ENCOUNTER — PATIENT MESSAGE (OUTPATIENT)
Dept: FAMILY MEDICINE | Facility: CLINIC | Age: 64
End: 2019-10-28

## 2019-11-07 DIAGNOSIS — Z12.11 COLON CANCER SCREENING: ICD-10-CM

## 2019-11-11 RX ORDER — ONDANSETRON 4 MG/1
8 TABLET, FILM COATED ORAL 2 TIMES DAILY
Qty: 180 TABLET | Refills: 3 | Status: SHIPPED | OUTPATIENT
Start: 2019-11-11

## 2019-11-18 ENCOUNTER — LAB VISIT (OUTPATIENT)
Dept: LAB | Facility: HOSPITAL | Age: 64
End: 2019-11-18
Attending: FAMILY MEDICINE
Payer: MEDICARE

## 2019-11-18 DIAGNOSIS — Z12.11 COLON CANCER SCREENING: ICD-10-CM

## 2019-11-18 PROCEDURE — 82274 ASSAY TEST FOR BLOOD FECAL: CPT

## 2019-12-02 LAB — HEMOCCULT STL QL IA: NEGATIVE

## 2019-12-05 ENCOUNTER — PATIENT MESSAGE (OUTPATIENT)
Dept: FAMILY MEDICINE | Facility: CLINIC | Age: 64
End: 2019-12-05

## 2019-12-27 ENCOUNTER — PATIENT OUTREACH (OUTPATIENT)
Dept: ADMINISTRATIVE | Facility: HOSPITAL | Age: 64
End: 2019-12-27

## 2019-12-27 NOTE — LETTER
January 6, 2020    Alida Chacon  [address] [place] [address]             [facility]  [address]  [facility] [address]  Phone: [phone] Dear Connie [facility] is committed to your overall health and would like to ensure that you are up to date on your recommended test and/or procedures.   Lavell Moreno MD  has found that your chart shows you may be due for the following:     Hepatitis C Screening due on 1955   Pneumococcal Vaccine (Highest Risk)(1 of 3 - PCV13) due on 10/26/1975   Pap Smear with HPV Cotest due on 10/26/1977   Shingles Vaccine(1 of 2) due on 10/26/2006   Influenza Vaccine(1) due on 09/01/2019     If you have had any of the above done at another facility, please let us know so that we may obtain copies from that facility.  If you have a copy of these records, please provide a copy for us to scan into your chart.  You are welcome to request that the report be faxed to us at  ([phone]).     Otherwise, please schedule these appointments at your earliest convenience by calling [phone] or going to [email or web address].     If you have an upcoming scheduled appointment for the item above, please disregard this letter.     Sincerely,   Your [facility]   MD Nusrat Lamar L.P.N. Clinical Care Coordinator   [address] [place] [address] [phone]

## 2019-12-27 NOTE — LETTER
FAX      AUTHORIZATION FOR RELEASE OF   CONFIDENTIAL INFORMATION            Dr. CHEYANNE DUMONT      We are seeing Alida Chacon, date of birth 1955, in the clinic at [facility]. Lavell Moreno MD is the patient's PCP. Alida Chacon has an outstanding lab/procedure at the time we reviewed her chart. In order to help keep her health information updated, she has authorized us to request the following medical record(s):        (  )  MAMMOGRAM                                      ( X )  COLONOSCOPY      (  )  PAP SMEAR                                          (  )  OUTSIDE LAB RESULTS     (  )  DEXA SCAN                                          (  )  DIABETIC EYE EXAM            (  )  DIABETIC FOOT EXAM                        (  )  ENTIRE RECORD     (  )  OUTSIDE IMMUNIZATIONS                 (  )  _______________        Please fax records to [facility]  [phone]     If you have any questions, please contact Nusrat at [phone].      Nusrat Moses L.P.N. Clinical Care Coordinator  [address] [place] [address], [place] [address] [phone]

## 2020-01-06 NOTE — PROGRESS NOTES
"Attempted to outreach patient for pre-visit via "[employer]", no answer after a week. Sending outreach via Mail Out Letter now.    "

## 2020-01-10 ENCOUNTER — OFFICE VISIT (OUTPATIENT)
Dept: FAMILY MEDICINE | Facility: CLINIC | Age: 65
End: 2020-01-10
Payer: MEDICARE

## 2020-01-10 ENCOUNTER — PATIENT MESSAGE (OUTPATIENT)
Dept: FAMILY MEDICINE | Facility: CLINIC | Age: 65
End: 2020-01-10

## 2020-01-10 VITALS
HEART RATE: 62 BPM | DIASTOLIC BLOOD PRESSURE: 79 MMHG | OXYGEN SATURATION: 96 % | HEIGHT: 63 IN | BODY MASS INDEX: 46.54 KG/M2 | WEIGHT: 262.69 LBS | RESPIRATION RATE: 18 BRPM | SYSTOLIC BLOOD PRESSURE: 133 MMHG

## 2020-01-10 DIAGNOSIS — Z12.31 ENCOUNTER FOR SCREENING MAMMOGRAM FOR MALIGNANT NEOPLASM OF BREAST: ICD-10-CM

## 2020-01-10 DIAGNOSIS — Z12.39 BREAST CANCER SCREENING: ICD-10-CM

## 2020-01-10 DIAGNOSIS — Z94.0 KIDNEY TRANSPLANT RECIPIENT: ICD-10-CM

## 2020-01-10 DIAGNOSIS — Z94.0 KIDNEY TRANSPLANT RECIPIENT: Primary | ICD-10-CM

## 2020-01-10 DIAGNOSIS — K86.1 CHRONIC PANCREATITIS, UNSPECIFIED PANCREATITIS TYPE: ICD-10-CM

## 2020-01-10 DIAGNOSIS — R61 NIGHT SWEATS: ICD-10-CM

## 2020-01-10 DIAGNOSIS — E55.9 VITAMIN D DEFICIENCY: ICD-10-CM

## 2020-01-10 DIAGNOSIS — Z79.899 HIGH RISK MEDICATION USE: ICD-10-CM

## 2020-01-10 PROCEDURE — 99999 PR PBB SHADOW E&M-EST. PATIENT-LVL V: CPT | Mod: PBBFAC,,, | Performed by: FAMILY MEDICINE

## 2020-01-10 PROCEDURE — 99214 OFFICE O/P EST MOD 30 MIN: CPT | Mod: S$PBB,,, | Performed by: FAMILY MEDICINE

## 2020-01-10 PROCEDURE — 99215 OFFICE O/P EST HI 40 MIN: CPT | Mod: PBBFAC,PN | Performed by: FAMILY MEDICINE

## 2020-01-10 PROCEDURE — 99214 PR OFFICE/OUTPT VISIT, EST, LEVL IV, 30-39 MIN: ICD-10-PCS | Mod: S$PBB,,, | Performed by: FAMILY MEDICINE

## 2020-01-10 PROCEDURE — 99999 PR PBB SHADOW E&M-EST. PATIENT-LVL V: ICD-10-PCS | Mod: PBBFAC,,, | Performed by: FAMILY MEDICINE

## 2020-01-10 RX ORDER — ONDANSETRON 4 MG/1
TABLET, ORALLY DISINTEGRATING ORAL
COMMUNITY
Start: 2019-11-11 | End: 2021-06-02 | Stop reason: SDUPTHER

## 2020-01-10 RX ORDER — TACROLIMUS 1 MG/1
3 CAPSULE ORAL EVERY 12 HOURS
Qty: 540 CAPSULE | Refills: 3 | Status: SHIPPED | OUTPATIENT
Start: 2020-01-10 | End: 2021-04-09 | Stop reason: SDUPTHER

## 2020-01-10 RX ORDER — OXYCODONE HYDROCHLORIDE 10 MG/1
10 TABLET ORAL
COMMUNITY
Start: 2019-12-31

## 2020-01-10 NOTE — PROGRESS NOTES
"Subjective:       Patient ID: Alida Chacon is a 64 y.o. female.    Chief Complaint: Follow-up (Pt refused pap smear)    In regards to the patient's hypertension, patient denies chest pain/sob, and reports compliance with medication regimen.    Pt states that kidney transplantation is well controlled  No issues/side effects  Compliant with treatment regimen    Pt states that vit d deficiency is well controlled  No issues/side effects  Compliant with treatment regimen    Review of Systems   Constitutional: Positive for fatigue. Negative for activity change, appetite change, chills and fever.   HENT: Negative for congestion, dental problem, facial swelling, nosebleeds, postnasal drip, sinus pain, sore throat, trouble swallowing and voice change.    Eyes: Negative for pain, discharge and visual disturbance.   Respiratory: Negative for apnea, cough, chest tightness and shortness of breath.    Cardiovascular: Negative for chest pain and palpitations.   Gastrointestinal: Negative for abdominal pain, blood in stool, constipation and nausea.   Endocrine: Negative for cold intolerance, polydipsia and polyuria.   Genitourinary: Negative for difficulty urinating, enuresis and flank pain.   Musculoskeletal: Positive for arthralgias, back pain and gait problem.   Skin: Negative for color change.   Allergic/Immunologic: Negative for environmental allergies and immunocompromised state.   Neurological: Negative for dizziness and light-headedness.   Hematological: Negative for adenopathy.   Psychiatric/Behavioral: Negative for agitation, behavioral problems, decreased concentration and dysphoric mood. The patient is not nervous/anxious.    All other systems reviewed and are negative.        Reviewed family, medical, surgical, and social history.    Objective:      /79 (BP Location: Left arm, Patient Position: Sitting, BP Method: Small (Automatic))   Pulse 62   Resp 18   Ht 5' 3" (1.6 m)   Wt 119.2 kg (262 lb 11.2 oz)  "  SpO2 96%   BMI 46.54 kg/m²   Physical Exam   Constitutional: She is oriented to person, place, and time. She appears well-developed and well-nourished. No distress.   HENT:   Head: Normocephalic and atraumatic.   Nose: Nose normal.   Mouth/Throat: Oropharynx is clear and moist. No oropharyngeal exudate.   Eyes: Pupils are equal, round, and reactive to light. Conjunctivae and EOM are normal. No scleral icterus.   Neck: Normal range of motion. Neck supple. No thyromegaly present.   Cardiovascular: Normal rate, regular rhythm and normal heart sounds. Exam reveals no gallop and no friction rub.   No murmur heard.  Pulmonary/Chest: Effort normal and breath sounds normal. No respiratory distress. She has no wheezes. She has no rales. She exhibits no tenderness.   Abdominal: Soft. Bowel sounds are normal. She exhibits no distension. There is no tenderness. There is no guarding.   Musculoskeletal: Normal range of motion. She exhibits tenderness and deformity. She exhibits no edema.   Lymphadenopathy:     She has no cervical adenopathy.   Neurological: She is alert and oriented to person, place, and time. She displays normal reflexes. No cranial nerve deficit or sensory deficit. She exhibits normal muscle tone.   Skin: Skin is warm and dry. No rash noted. She is not diaphoretic. No erythema. No pallor.   Psychiatric: She has a normal mood and affect. Her behavior is normal. Judgment and thought content normal.   Nursing note and vitals reviewed.      Assessment:       1. Kidney transplant recipient    2. Vitamin D deficiency    3. Breast cancer screening    4. Encounter for screening mammogram for malignant neoplasm of breast     5. High risk medication use    6. Night sweats    7. Chronic pancreatitis, unspecified pancreatitis type        Plan:       Kidney transplant recipient  -     Vitamin D; Standing  -     CBC auto differential; Standing  -     Comprehensive metabolic panel; Standing  -     Urinalysis; Standing  -      Protein / creatinine ratio, urine; Standing  -     Tacrolimus level; Standing  -     TSH; Future; Expected date: 01/10/2020  -     T4, free; Future; Expected date: 01/10/2020    Vitamin D deficiency  -     Vitamin D; Standing  -     CBC auto differential; Standing  -     Comprehensive metabolic panel; Standing  -     Urinalysis; Standing  -     Protein / creatinine ratio, urine; Standing  -     Tacrolimus level; Standing  -     TSH; Future; Expected date: 01/10/2020  -     T4, free; Future; Expected date: 01/10/2020    Breast cancer screening  -     Mammo Digital Screening Bilat; Future; Expected date: 01/10/2020  -     TSH; Future; Expected date: 01/10/2020  -     T4, free; Future; Expected date: 01/10/2020    Encounter for screening mammogram for malignant neoplasm of breast   -     Mammo Digital Screening Bilat; Future; Expected date: 01/10/2020  -     TSH; Future; Expected date: 01/10/2020  -     T4, free; Future; Expected date: 01/10/2020    High risk medication use  -     TSH; Future; Expected date: 01/10/2020  -     T4, free; Future; Expected date: 01/10/2020    Night sweats  -     X-Ray Chest PA And Lateral; Future; Expected date: 01/10/2020    Chronic pancreatitis, unspecified pancreatitis type  -     Ambulatory referral to Gastroenterology            Risks, benefits, and side effects were discussed with the patient. All questions were answered to the fullest satisfaction of the patient, and pt verbalized understanding and agreement to treatment plan. Pt was to call with any new or worsening symptoms, or present to the ER.

## 2020-01-10 NOTE — TELEPHONE ENCOUNTER
Pt message:      Could you please fax a prescription to [facility] in [place] at [phone](fax) for a 90 day supply of Tacrolimus 1mg cap, take 3cap po q12h.  [facility] regular store phone number [phone].  Thank you     The name of the organic ointment that I use is Curoxen.     Alida Chacon

## 2020-01-15 ENCOUNTER — HOSPITAL ENCOUNTER (OUTPATIENT)
Dept: RADIOLOGY | Facility: HOSPITAL | Age: 65
Discharge: HOME | End: 2020-01-15
Attending: FAMILY MEDICINE
Payer: MEDICARE

## 2020-01-15 DIAGNOSIS — R61 NIGHT SWEATS: ICD-10-CM

## 2020-01-15 PROCEDURE — 71046 XR CHEST PA AND LATERAL: ICD-10-PCS | Mod: 26,,, | Performed by: RADIOLOGY

## 2020-01-15 PROCEDURE — 71046 X-RAY EXAM CHEST 2 VIEWS: CPT | Mod: TC,PN

## 2020-01-15 PROCEDURE — 71046 X-RAY EXAM CHEST 2 VIEWS: CPT | Mod: 26,,, | Performed by: RADIOLOGY

## 2020-01-23 ENCOUNTER — PATIENT OUTREACH (OUTPATIENT)
Dept: ADMINISTRATIVE | Facility: OTHER | Age: 65
End: 2020-01-23

## 2020-01-24 RX ORDER — OMEPRAZOLE 40 MG/1
CAPSULE, DELAYED RELEASE ORAL
Refills: 2 | OUTPATIENT
Start: 2020-01-24

## 2020-01-24 RX ORDER — ERGOCALCIFEROL 1.25 MG/1
CAPSULE ORAL
Qty: 12 CAPSULE | Refills: 5 | Status: SHIPPED | OUTPATIENT
Start: 2020-01-24 | End: 2020-12-28 | Stop reason: SDUPTHER

## 2020-01-24 RX ORDER — ALENDRONATE SODIUM 70 MG/1
TABLET ORAL
Qty: 12 TABLET | Refills: 3 | OUTPATIENT
Start: 2020-01-24

## 2020-01-27 RX ORDER — IRBESARTAN 300 MG/1
TABLET ORAL
Qty: 45 TABLET | Refills: 11 | Status: SHIPPED | OUTPATIENT
Start: 2020-01-27 | End: 2021-06-02

## 2020-01-28 ENCOUNTER — HOSPITAL ENCOUNTER (OUTPATIENT)
Dept: RADIOLOGY | Facility: HOSPITAL | Age: 65
Discharge: HOME | End: 2020-01-28
Attending: FAMILY MEDICINE
Payer: MEDICARE

## 2020-01-28 VITALS — BODY MASS INDEX: 46.57 KG/M2 | HEIGHT: 63 IN | WEIGHT: 262.81 LBS

## 2020-01-28 DIAGNOSIS — Z12.31 ENCOUNTER FOR SCREENING MAMMOGRAM FOR MALIGNANT NEOPLASM OF BREAST: ICD-10-CM

## 2020-01-28 DIAGNOSIS — Z12.39 BREAST CANCER SCREENING: ICD-10-CM

## 2020-01-28 PROCEDURE — 77067 SCR MAMMO BI INCL CAD: CPT | Mod: TC

## 2020-01-28 PROCEDURE — 77063 MAMMO DIGITAL SCREENING BILAT WITH TOMOSYNTHESIS_CAD: ICD-10-PCS | Mod: 26,,, | Performed by: RADIOLOGY

## 2020-01-28 PROCEDURE — 77067 SCR MAMMO BI INCL CAD: CPT | Mod: 26,,, | Performed by: RADIOLOGY

## 2020-01-28 PROCEDURE — 77063 BREAST TOMOSYNTHESIS BI: CPT | Mod: 26,,, | Performed by: RADIOLOGY

## 2020-01-28 PROCEDURE — 77067 MAMMO DIGITAL SCREENING BILAT WITH TOMOSYNTHESIS_CAD: ICD-10-PCS | Mod: 26,,, | Performed by: RADIOLOGY

## 2020-01-28 RX ORDER — PREDNISONE 5 MG/1
TABLET ORAL
Qty: 90 TABLET | Refills: 3 | Status: SHIPPED | OUTPATIENT
Start: 2020-01-28 | End: 2020-12-28 | Stop reason: SDUPTHER

## 2020-01-28 RX ORDER — ALENDRONATE SODIUM 70 MG/1
TABLET ORAL
Qty: 12 TABLET | Refills: 3 | Status: SHIPPED | OUTPATIENT
Start: 2020-01-28 | End: 2020-12-28 | Stop reason: SDUPTHER

## 2020-01-28 RX ORDER — OMEPRAZOLE 40 MG/1
CAPSULE, DELAYED RELEASE ORAL
Qty: 90 CAPSULE | Refills: 3 | Status: SHIPPED | OUTPATIENT
Start: 2020-01-28 | End: 2020-12-28 | Stop reason: SDUPTHER

## 2020-01-29 ENCOUNTER — PATIENT MESSAGE (OUTPATIENT)
Dept: FAMILY MEDICINE | Facility: CLINIC | Age: 65
End: 2020-01-29

## 2020-05-14 ENCOUNTER — PATIENT MESSAGE (OUTPATIENT)
Dept: FAMILY MEDICINE | Facility: CLINIC | Age: 65
End: 2020-05-14

## 2020-05-14 NOTE — TELEPHONE ENCOUNTER
Please advise  I see some old lab orders in but I am not sure what she is referring too.     Thank you

## 2020-05-18 ENCOUNTER — TELEPHONE (OUTPATIENT)
Dept: FAMILY MEDICINE | Facility: CLINIC | Age: 65
End: 2020-05-18

## 2020-05-18 ENCOUNTER — LAB VISIT (OUTPATIENT)
Dept: LAB | Facility: HOSPITAL | Age: 65
End: 2020-05-18
Attending: FAMILY MEDICINE
Payer: MEDICARE

## 2020-05-18 DIAGNOSIS — Z94.0 KIDNEY TRANSPLANT RECIPIENT: Primary | ICD-10-CM

## 2020-05-18 DIAGNOSIS — E55.9 VITAMIN D DEFICIENCY: ICD-10-CM

## 2020-05-18 DIAGNOSIS — Z94.0 KIDNEY TRANSPLANT RECIPIENT: ICD-10-CM

## 2020-05-18 LAB
25(OH)D3+25(OH)D2 SERPL-MCNC: 40 NG/ML (ref 30–96)
ALBUMIN SERPL BCP-MCNC: 4 G/DL (ref 3.5–5.2)
ALP SERPL-CCNC: 54 U/L (ref 55–135)
ALT SERPL W/O P-5'-P-CCNC: 17 U/L (ref 10–44)
ANION GAP SERPL CALC-SCNC: 10 MMOL/L (ref 8–16)
AST SERPL-CCNC: 19 U/L (ref 10–40)
BASOPHILS # BLD AUTO: 0.03 K/UL (ref 0–0.2)
BASOPHILS NFR BLD: 0.3 % (ref 0–1.9)
BILIRUB SERPL-MCNC: 0.2 MG/DL (ref 0.1–1)
BUN SERPL-MCNC: 26 MG/DL (ref 8–23)
CALCIUM SERPL-MCNC: 8.7 MG/DL (ref 8.7–10.5)
CHLORIDE SERPL-SCNC: 100 MMOL/L (ref 95–110)
CO2 SERPL-SCNC: 22 MMOL/L (ref 23–29)
CREAT SERPL-MCNC: 0.9 MG/DL (ref 0.5–1.4)
DIFFERENTIAL METHOD: ABNORMAL
EOSINOPHIL # BLD AUTO: 0.2 K/UL (ref 0–0.5)
EOSINOPHIL NFR BLD: 1.6 % (ref 0–8)
ERYTHROCYTE [DISTWIDTH] IN BLOOD BY AUTOMATED COUNT: 12.9 % (ref 11.5–14.5)
EST. GFR  (AFRICAN AMERICAN): >60 ML/MIN/1.73 M^2
EST. GFR  (NON AFRICAN AMERICAN): >60 ML/MIN/1.73 M^2
GLUCOSE SERPL-MCNC: 94 MG/DL (ref 70–110)
HCT VFR BLD AUTO: 38.9 % (ref 37–48.5)
HGB BLD-MCNC: 12.7 G/DL (ref 12–16)
IMM GRANULOCYTES # BLD AUTO: 0.03 K/UL (ref 0–0.04)
IMM GRANULOCYTES NFR BLD AUTO: 0.3 % (ref 0–0.5)
LYMPHOCYTES # BLD AUTO: 1.4 K/UL (ref 1–4.8)
LYMPHOCYTES NFR BLD: 13.6 % (ref 18–48)
MCH RBC QN AUTO: 27.9 PG (ref 27–31)
MCHC RBC AUTO-ENTMCNC: 32.6 G/DL (ref 32–36)
MCV RBC AUTO: 86 FL (ref 82–98)
MONOCYTES # BLD AUTO: 0.5 K/UL (ref 0.3–1)
MONOCYTES NFR BLD: 4.6 % (ref 4–15)
NEUTROPHILS # BLD AUTO: 8.1 K/UL (ref 1.8–7.7)
NEUTROPHILS NFR BLD: 79.6 % (ref 38–73)
NRBC BLD-RTO: 0 /100 WBC
PLATELET # BLD AUTO: 208 K/UL (ref 150–350)
PMV BLD AUTO: 9.2 FL (ref 9.2–12.9)
POTASSIUM SERPL-SCNC: 4 MMOL/L (ref 3.5–5.1)
PROT SERPL-MCNC: 7.1 G/DL (ref 6–8.4)
RBC # BLD AUTO: 4.55 M/UL (ref 4–5.4)
SODIUM SERPL-SCNC: 132 MMOL/L (ref 136–145)
WBC # BLD AUTO: 10.19 K/UL (ref 3.9–12.7)

## 2020-05-18 PROCEDURE — 36415 COLL VENOUS BLD VENIPUNCTURE: CPT

## 2020-05-18 PROCEDURE — 85025 COMPLETE CBC W/AUTO DIFF WBC: CPT

## 2020-05-18 PROCEDURE — 80053 COMPREHEN METABOLIC PANEL: CPT

## 2020-05-18 PROCEDURE — 80197 ASSAY OF TACROLIMUS: CPT

## 2020-05-18 PROCEDURE — 82306 VITAMIN D 25 HYDROXY: CPT

## 2020-05-19 LAB — TACROLIMUS BLD-MCNC: 4.1 NG/ML (ref 5–15)

## 2020-05-20 ENCOUNTER — PATIENT MESSAGE (OUTPATIENT)
Dept: ADMINISTRATIVE | Facility: HOSPITAL | Age: 65
End: 2020-05-20

## 2020-05-27 ENCOUNTER — PATIENT OUTREACH (OUTPATIENT)
Dept: ADMINISTRATIVE | Facility: HOSPITAL | Age: 65
End: 2020-05-27

## 2020-05-27 NOTE — LETTER
June 4, 2020    Alida Chacon  [address] [place] [address]             [facility]  [address]  [facility] [address]  Phone: [phone] Dear [facility] is committed to your overall health and would like to ensure that you are up to date on your recommended test and/or procedures.   Lavell Moreno MD  has found that your chart shows you may be due for the following:      Pneumococcal Vaccine (Highest Risk)(1 of 3 - PCV13) due on 10/26/1975   Pap Smear with HPV Cotest due on 10/26/1977   Shingles Vaccine(1 of 2) due on 10/26/2006     If you have had any of the above done at another facility, please let us know so that we may obtain copies from that facility.  If you have a copy of these records, please provide a copy for us to scan into your chart.  You are welcome to request that the report be faxed to us at  ([phone]).     Otherwise, please schedule these appointments at your earliest convenience by calling [phone] or going to [email or web address].     If you have an upcoming scheduled appointment for the item above, please disregard this letter.     Sincerely,   Your [facility]   MD Nusrat Lamar L.P.N. Clinical Care Coordinator   [address] [place] [address]   [phone]

## 2020-06-04 NOTE — PROGRESS NOTES
"Attempted to outreach patient for pre-visit via "We[employer]", no answer after a week. Sending outreach via Mail Out Letter now.    "

## 2020-06-10 ENCOUNTER — OFFICE VISIT (OUTPATIENT)
Dept: FAMILY MEDICINE | Facility: CLINIC | Age: 65
End: 2020-06-10
Payer: MEDICARE

## 2020-06-10 DIAGNOSIS — M46.1 SACROILIITIS, NOT ELSEWHERE CLASSIFIED: ICD-10-CM

## 2020-06-10 DIAGNOSIS — L98.429: ICD-10-CM

## 2020-06-10 DIAGNOSIS — M87.052 ASEPTIC NECROSIS OF HEAD OF LEFT FEMUR: ICD-10-CM

## 2020-06-10 DIAGNOSIS — N18.6 END STAGE RENAL DISEASE: ICD-10-CM

## 2020-06-10 DIAGNOSIS — R07.89 OTHER CHEST PAIN: ICD-10-CM

## 2020-06-10 DIAGNOSIS — D68.62 LUPUS ANTICOAGULANT DISORDER: Primary | ICD-10-CM

## 2020-06-10 PROCEDURE — 99214 OFFICE O/P EST MOD 30 MIN: CPT | Mod: S$PBB,,, | Performed by: FAMILY MEDICINE

## 2020-06-10 PROCEDURE — 99999 PR PBB SHADOW E&M-EST. PATIENT-LVL III: CPT | Mod: PBBFAC,,, | Performed by: FAMILY MEDICINE

## 2020-06-10 PROCEDURE — 99213 OFFICE O/P EST LOW 20 MIN: CPT | Mod: PBBFAC,PN | Performed by: FAMILY MEDICINE

## 2020-06-10 PROCEDURE — 99999 PR PBB SHADOW E&M-EST. PATIENT-LVL III: ICD-10-PCS | Mod: PBBFAC,,, | Performed by: FAMILY MEDICINE

## 2020-06-10 PROCEDURE — 99214 PR OFFICE/OUTPT VISIT, EST, LEVL IV, 30-39 MIN: ICD-10-PCS | Mod: S$PBB,,, | Performed by: FAMILY MEDICINE

## 2020-06-10 NOTE — PROGRESS NOTES
"Subjective:       Patient ID: Alida Chacon is a 64 y.o. female.    Chief Complaint: Follow-up (BW review) and Otalgia (L ear )    Left ear pain  Worsening  No fever  Chronic neck and back pain  Some CANTU    Review of Systems   Constitutional: Positive for fatigue. Negative for activity change, appetite change, chills and fever.   HENT: Negative for congestion, dental problem, facial swelling, nosebleeds, postnasal drip, sinus pain, sore throat, trouble swallowing and voice change.    Eyes: Negative for pain, discharge and visual disturbance.   Respiratory: Negative for apnea, cough, chest tightness and shortness of breath.    Cardiovascular: Negative for chest pain and palpitations.   Gastrointestinal: Negative for abdominal pain, blood in stool, constipation and nausea.   Endocrine: Negative for cold intolerance, polydipsia and polyuria.   Genitourinary: Negative for difficulty urinating, enuresis and flank pain.   Musculoskeletal: Positive for arthralgias, back pain and gait problem.   Skin: Negative for color change.   Allergic/Immunologic: Negative for environmental allergies and immunocompromised state.   Neurological: Negative for dizziness and light-headedness.   Hematological: Negative for adenopathy.   Psychiatric/Behavioral: Negative for agitation, behavioral problems, decreased concentration and dysphoric mood. The patient is not nervous/anxious.    All other systems reviewed and are negative.        Reviewed family, medical, surgical, and social history.    Objective:      /70 Comment: home reading  Pulse 73   Temp 98.3 °F (36.8 °C) (Oral)   Resp 16   Ht 5' 3" (1.6 m)   Wt 120.7 kg (266 lb 3.2 oz)   SpO2 98%   BMI 47.16 kg/m²   Physical Exam   Constitutional: She is oriented to person, place, and time. She appears well-developed and well-nourished. No distress.   HENT:   Head: Normocephalic and atraumatic.   Nose: Nose normal.   Mouth/Throat: Oropharynx is clear and moist. No " oropharyngeal exudate.   Eyes: Pupils are equal, round, and reactive to light. Conjunctivae and EOM are normal. No scleral icterus.   Neck: Normal range of motion. Neck supple. No thyromegaly present.   Cardiovascular: Normal rate, regular rhythm and normal heart sounds. Exam reveals no gallop and no friction rub.   No murmur heard.  Pulmonary/Chest: Effort normal and breath sounds normal. No respiratory distress. She has no wheezes. She has no rales. She exhibits no tenderness.   Abdominal: Soft. Bowel sounds are normal. She exhibits no distension. There is no tenderness. There is no guarding.   Musculoskeletal: Normal range of motion. She exhibits tenderness and deformity. She exhibits no edema.   Lymphadenopathy:     She has no cervical adenopathy.   Neurological: She is alert and oriented to person, place, and time. She displays normal reflexes. No cranial nerve deficit or sensory deficit. She exhibits normal muscle tone.   Skin: Skin is warm and dry. No rash noted. She is not diaphoretic. No erythema. No pallor.   Psychiatric: She has a normal mood and affect. Her behavior is normal. Judgment and thought content normal.   Nursing note and vitals reviewed.      Assessment:       1. Lupus anticoagulant disorder    2. Body mass index (BMI) 45.0-49.9, adult    3. Aseptic necrosis of head of left femur    4. Sacroiliitis, not elsewhere classified    5. End stage renal disease    6. Non-pressure chronic ulcer of back, unspecified ulcer stage    7. Other chest pain        Plan:       Lupus anticoagulant disorder    Body mass index (BMI) 45.0-49.9, adult    Aseptic necrosis of head of left femur    Sacroiliitis, not elsewhere classified    End stage renal disease    Non-pressure chronic ulcer of back, unspecified ulcer stage    Other chest pain  -     Ambulatory referral/consult to Cardiology; Future; Expected date: 06/17/2020    continue current medicines  Reviewed labs with patient  Follow up with specialists as  shown        Risks, benefits, and side effects were discussed with the patient. All questions were answered to the fullest satisfaction of the patient, and pt verbalized understanding and agreement to treatment plan. Pt was to call with any new or worsening symptoms, or present to the ER.

## 2020-06-11 VITALS
WEIGHT: 266.19 LBS | BODY MASS INDEX: 47.16 KG/M2 | SYSTOLIC BLOOD PRESSURE: 130 MMHG | HEART RATE: 73 BPM | TEMPERATURE: 98 F | OXYGEN SATURATION: 98 % | DIASTOLIC BLOOD PRESSURE: 70 MMHG | HEIGHT: 63 IN | RESPIRATION RATE: 16 BRPM

## 2020-08-19 ENCOUNTER — PATIENT OUTREACH (OUTPATIENT)
Dept: ADMINISTRATIVE | Facility: OTHER | Age: 65
End: 2020-08-19

## 2020-08-19 NOTE — PROGRESS NOTES
Health Maintenance Due   Topic Date Due    Pneumococcal Vaccine (Highest Risk) (1 of 3 - PCV13) 10/26/1975    Shingles Vaccine (1 of 2) 10/26/2006     Updates were requested from care everywhere.  Chart was reviewed for overdue Proactive [facility] Encounters (TOMMY) topics (CRS, Breast Cancer Screening, Eye exam)  Health Maintenance has been updated.  LINKS immunization registry triggered.  Immunizations were not able to be reconciled. Patient not found in LINKS.

## 2020-08-27 ENCOUNTER — PATIENT OUTREACH (OUTPATIENT)
Dept: ADMINISTRATIVE | Facility: HOSPITAL | Age: 65
End: 2020-08-27

## 2020-08-27 NOTE — PROGRESS NOTES
Chart review completed 2020.  Care Everywhere updates requested and reviewed.  Immunizations reconciled. Media reports reviewed.  Duplicate HM overrides and  orders removed.  Overdue HM topic chart audit and/or requested.  Overdue lab testing linked to upcoming lab appointments if applies.    Lab zena, and [employer] reviewed   Pap Smear and Lab testing       Not in Links 2020      Health Maintenance Due   Topic Date Due    HIV Screening  10/26/1971    Pneumococcal Vaccine (Highest Risk) (1 of 3 - PCV13) 10/26/1975    Cervical Cancer Screening  10/26/1977    Shingles Vaccine (1 of 2) 10/26/2006

## 2020-09-09 ENCOUNTER — OFFICE VISIT (OUTPATIENT)
Dept: FAMILY MEDICINE | Facility: CLINIC | Age: 65
End: 2020-09-09
Payer: MEDICARE

## 2020-09-09 VITALS
SYSTOLIC BLOOD PRESSURE: 120 MMHG | HEIGHT: 63 IN | RESPIRATION RATE: 18 BRPM | BODY MASS INDEX: 48 KG/M2 | DIASTOLIC BLOOD PRESSURE: 70 MMHG | WEIGHT: 270.88 LBS | OXYGEN SATURATION: 96 % | HEART RATE: 62 BPM | TEMPERATURE: 98 F

## 2020-09-09 DIAGNOSIS — D68.62 LUPUS ANTICOAGULANT DISORDER: ICD-10-CM

## 2020-09-09 DIAGNOSIS — H25.10 NUCLEAR SCLEROSIS, UNSPECIFIED LATERALITY: Primary | ICD-10-CM

## 2020-09-09 DIAGNOSIS — I10 ESSENTIAL HYPERTENSION: ICD-10-CM

## 2020-09-09 DIAGNOSIS — H60.62 CHRONIC OTITIS EXTERNA OF LEFT EAR, UNSPECIFIED TYPE: ICD-10-CM

## 2020-09-09 DIAGNOSIS — E55.9 VITAMIN D DEFICIENCY: ICD-10-CM

## 2020-09-09 DIAGNOSIS — E53.8 VITAMIN B 12 DEFICIENCY: ICD-10-CM

## 2020-09-09 DIAGNOSIS — R73.9 HYPERGLYCEMIA: ICD-10-CM

## 2020-09-09 DIAGNOSIS — N18.6 END STAGE RENAL DISEASE: ICD-10-CM

## 2020-09-09 DIAGNOSIS — M46.1 SACROILIITIS, NOT ELSEWHERE CLASSIFIED: ICD-10-CM

## 2020-09-09 PROCEDURE — 99214 OFFICE O/P EST MOD 30 MIN: CPT | Mod: S$PBB,,, | Performed by: FAMILY MEDICINE

## 2020-09-09 PROCEDURE — 99999 PR PBB SHADOW E&M-EST. PATIENT-LVL V: ICD-10-PCS | Mod: PBBFAC,,, | Performed by: FAMILY MEDICINE

## 2020-09-09 PROCEDURE — 99215 OFFICE O/P EST HI 40 MIN: CPT | Mod: PBBFAC,PN | Performed by: FAMILY MEDICINE

## 2020-09-09 PROCEDURE — 99999 PR PBB SHADOW E&M-EST. PATIENT-LVL V: CPT | Mod: PBBFAC,,, | Performed by: FAMILY MEDICINE

## 2020-09-09 PROCEDURE — 99214 PR OFFICE/OUTPT VISIT, EST, LEVL IV, 30-39 MIN: ICD-10-PCS | Mod: S$PBB,,, | Performed by: FAMILY MEDICINE

## 2020-09-09 RX ORDER — CIPROFLOXACIN AND DEXAMETHASONE 3; 1 MG/ML; MG/ML
4 SUSPENSION/ DROPS AURICULAR (OTIC) 2 TIMES DAILY
Qty: 7.5 ML | Refills: 0 | Status: SHIPPED | OUTPATIENT
Start: 2020-09-09

## 2020-09-09 RX ORDER — IRBESARTAN 150 MG/1
150 TABLET ORAL DAILY
Qty: 90 TABLET | Refills: 3 | Status: SHIPPED | OUTPATIENT
Start: 2020-09-09 | End: 2020-12-28 | Stop reason: SDUPTHER

## 2020-09-09 NOTE — PROGRESS NOTES
"Subjective:       Patient ID: Alida Chacon is a 64 y.o. female.    Chief Complaint: Follow-up and Medication Refill    In regards to the patient's hypertension, patient denies chest pain/sob, and reports compliance with medication regimen.    Review of Systems   Constitutional: Positive for fatigue. Negative for activity change, appetite change, chills and fever.   HENT: Negative for congestion, dental problem, facial swelling, nosebleeds, postnasal drip, sinus pain, sore throat, trouble swallowing and voice change.    Eyes: Negative for pain, discharge and visual disturbance.   Respiratory: Negative for apnea, cough, chest tightness and shortness of breath.    Cardiovascular: Negative for chest pain and palpitations.   Gastrointestinal: Negative for abdominal pain, blood in stool, constipation and nausea.   Endocrine: Negative for cold intolerance, polydipsia and polyuria.   Genitourinary: Negative for difficulty urinating, enuresis and flank pain.   Musculoskeletal: Positive for arthralgias, back pain and gait problem.   Skin: Negative for color change.   Allergic/Immunologic: Negative for environmental allergies and immunocompromised state.   Neurological: Negative for dizziness and light-headedness.   Hematological: Negative for adenopathy.   Psychiatric/Behavioral: Negative for agitation, behavioral problems, decreased concentration and dysphoric mood. The patient is not nervous/anxious.    All other systems reviewed and are negative.        Reviewed family, medical, surgical, and social history.    Objective:      /70 Comment: home reading  Pulse 62   Temp 97.9 °F (36.6 °C) (Oral)   Resp 18   Ht 5' 3" (1.6 m)   Wt 122.9 kg (270 lb 14.4 oz)   SpO2 96%   BMI 47.99 kg/m²   Physical Exam  Vitals signs and nursing note reviewed.   Constitutional:       General: She is not in acute distress.     Appearance: She is well-developed. She is not diaphoretic.   HENT:      Head: Normocephalic and " atraumatic.      Nose: Nose normal.      Mouth/Throat:      Pharynx: No oropharyngeal exudate.   Eyes:      General: No scleral icterus.     Conjunctiva/sclera: Conjunctivae normal.      Pupils: Pupils are equal, round, and reactive to light.   Neck:      Musculoskeletal: Normal range of motion and neck supple.      Thyroid: No thyromegaly.   Cardiovascular:      Rate and Rhythm: Normal rate and regular rhythm.      Heart sounds: Normal heart sounds. No murmur. No friction rub. No gallop.    Pulmonary:      Effort: Pulmonary effort is normal. No respiratory distress.      Breath sounds: Normal breath sounds. No wheezing or rales.   Chest:      Chest wall: No tenderness.   Abdominal:      General: Bowel sounds are normal. There is no distension.      Palpations: Abdomen is soft.      Tenderness: There is no abdominal tenderness. There is no guarding.   Musculoskeletal: Normal range of motion.         General: Tenderness and deformity present.   Lymphadenopathy:      Cervical: No cervical adenopathy.   Skin:     General: Skin is warm and dry.      Coloration: Skin is not pale.      Findings: No erythema or rash.   Neurological:      Mental Status: She is alert and oriented to person, place, and time.      Cranial Nerves: No cranial nerve deficit.      Sensory: No sensory deficit.      Motor: No abnormal muscle tone.      Deep Tendon Reflexes: Reflexes normal.   Psychiatric:         Behavior: Behavior normal.         Thought Content: Thought content normal.         Judgment: Judgment normal.         Assessment:       1. Nuclear sclerosis, unspecified laterality    2. Chronic otitis externa of left ear, unspecified type    3. Lupus anticoagulant disorder    4. End stage renal disease    5. Sacroiliitis, not elsewhere classified    6. Vitamin D deficiency    7. Vitamin B 12 deficiency    8. Essential hypertension    9. Hyperglycemia        Plan:       Nuclear sclerosis, unspecified laterality  -     Protein / creatinine  ratio, urine  -     CBC auto differential; Future; Expected date: 09/09/2020  -     Comprehensive metabolic panel; Future; Expected date: 09/09/2020  -     Hemoglobin A1C; Future; Expected date: 09/09/2020  -     Lipid Panel; Future; Expected date: 09/09/2020  -     TSH; Future; Expected date: 09/09/2020  -     T4, free; Future; Expected date: 09/09/2020  -     T3; Future; Expected date: 09/09/2020  -     PTH, intact; Future; Expected date: 09/09/2020  -     Tacrolimus level; Future; Expected date: 09/09/2020  -     Thyroid peroxidase antibody; Future; Expected date: 09/09/2020  -     Vitamin D; Future; Expected date: 09/09/2020  -     Urinalysis    Chronic otitis externa of left ear, unspecified type  -     ciprofloxacin-dexamethasone 0.3-0.1% (CIPRODEX) 0.3-0.1 % DrpS; Place 4 drops into both ears 2 (two) times daily. na  Dispense: 7.5 mL; Refill: 0  -     Protein / creatinine ratio, urine  -     CBC auto differential; Future; Expected date: 09/09/2020  -     Comprehensive metabolic panel; Future; Expected date: 09/09/2020  -     Hemoglobin A1C; Future; Expected date: 09/09/2020  -     Lipid Panel; Future; Expected date: 09/09/2020  -     TSH; Future; Expected date: 09/09/2020  -     T4, free; Future; Expected date: 09/09/2020  -     T3; Future; Expected date: 09/09/2020  -     PTH, intact; Future; Expected date: 09/09/2020  -     Tacrolimus level; Future; Expected date: 09/09/2020  -     Thyroid peroxidase antibody; Future; Expected date: 09/09/2020  -     Vitamin D; Future; Expected date: 09/09/2020  -     Urinalysis    Lupus anticoagulant disorder  -     Protein / creatinine ratio, urine  -     CBC auto differential; Future; Expected date: 09/09/2020  -     Comprehensive metabolic panel; Future; Expected date: 09/09/2020  -     Hemoglobin A1C; Future; Expected date: 09/09/2020  -     Lipid Panel; Future; Expected date: 09/09/2020  -     TSH; Future; Expected date: 09/09/2020  -     T4, free; Future; Expected  date: 09/09/2020  -     T3; Future; Expected date: 09/09/2020  -     PTH, intact; Future; Expected date: 09/09/2020  -     Tacrolimus level; Future; Expected date: 09/09/2020  -     Thyroid peroxidase antibody; Future; Expected date: 09/09/2020  -     Vitamin D; Future; Expected date: 09/09/2020  -     Urinalysis    End stage renal disease  -     Protein / creatinine ratio, urine  -     CBC auto differential; Future; Expected date: 09/09/2020  -     Comprehensive metabolic panel; Future; Expected date: 09/09/2020  -     Hemoglobin A1C; Future; Expected date: 09/09/2020  -     Lipid Panel; Future; Expected date: 09/09/2020  -     TSH; Future; Expected date: 09/09/2020  -     T4, free; Future; Expected date: 09/09/2020  -     T3; Future; Expected date: 09/09/2020  -     PTH, intact; Future; Expected date: 09/09/2020  -     Tacrolimus level; Future; Expected date: 09/09/2020  -     Thyroid peroxidase antibody; Future; Expected date: 09/09/2020  -     Vitamin D; Future; Expected date: 09/09/2020  -     Urinalysis    Sacroiliitis, not elsewhere classified  -     Protein / creatinine ratio, urine  -     CBC auto differential; Future; Expected date: 09/09/2020  -     Comprehensive metabolic panel; Future; Expected date: 09/09/2020  -     Hemoglobin A1C; Future; Expected date: 09/09/2020  -     Lipid Panel; Future; Expected date: 09/09/2020  -     TSH; Future; Expected date: 09/09/2020  -     T4, free; Future; Expected date: 09/09/2020  -     T3; Future; Expected date: 09/09/2020  -     PTH, intact; Future; Expected date: 09/09/2020  -     Tacrolimus level; Future; Expected date: 09/09/2020  -     Thyroid peroxidase antibody; Future; Expected date: 09/09/2020  -     Vitamin D; Future; Expected date: 09/09/2020  -     Urinalysis    Vitamin D deficiency  -     Protein / creatinine ratio, urine  -     CBC auto differential; Future; Expected date: 09/09/2020  -     Comprehensive metabolic panel; Future; Expected date:  09/09/2020  -     Hemoglobin A1C; Future; Expected date: 09/09/2020  -     Lipid Panel; Future; Expected date: 09/09/2020  -     TSH; Future; Expected date: 09/09/2020  -     T4, free; Future; Expected date: 09/09/2020  -     T3; Future; Expected date: 09/09/2020  -     PTH, intact; Future; Expected date: 09/09/2020  -     Tacrolimus level; Future; Expected date: 09/09/2020  -     Thyroid peroxidase antibody; Future; Expected date: 09/09/2020  -     Vitamin D; Future; Expected date: 09/09/2020  -     Urinalysis    Vitamin B 12 deficiency  -     Protein / creatinine ratio, urine  -     CBC auto differential; Future; Expected date: 09/09/2020  -     Comprehensive metabolic panel; Future; Expected date: 09/09/2020  -     Hemoglobin A1C; Future; Expected date: 09/09/2020  -     Lipid Panel; Future; Expected date: 09/09/2020  -     TSH; Future; Expected date: 09/09/2020  -     T4, free; Future; Expected date: 09/09/2020  -     T3; Future; Expected date: 09/09/2020  -     PTH, intact; Future; Expected date: 09/09/2020  -     Tacrolimus level; Future; Expected date: 09/09/2020  -     Thyroid peroxidase antibody; Future; Expected date: 09/09/2020  -     Vitamin D; Future; Expected date: 09/09/2020  -     Urinalysis    Essential hypertension  -     Protein / creatinine ratio, urine  -     CBC auto differential; Future; Expected date: 09/09/2020  -     Comprehensive metabolic panel; Future; Expected date: 09/09/2020  -     Hemoglobin A1C; Future; Expected date: 09/09/2020  -     Lipid Panel; Future; Expected date: 09/09/2020  -     TSH; Future; Expected date: 09/09/2020  -     T4, free; Future; Expected date: 09/09/2020  -     T3; Future; Expected date: 09/09/2020  -     PTH, intact; Future; Expected date: 09/09/2020  -     Tacrolimus level; Future; Expected date: 09/09/2020  -     Thyroid peroxidase antibody; Future; Expected date: 09/09/2020  -     Vitamin D; Future; Expected date: 09/09/2020  -      Urinalysis    Hyperglycemia  -     Protein / creatinine ratio, urine  -     CBC auto differential; Future; Expected date: 09/09/2020  -     Comprehensive metabolic panel; Future; Expected date: 09/09/2020  -     Hemoglobin A1C; Future; Expected date: 09/09/2020  -     Lipid Panel; Future; Expected date: 09/09/2020  -     TSH; Future; Expected date: 09/09/2020  -     T4, free; Future; Expected date: 09/09/2020  -     T3; Future; Expected date: 09/09/2020  -     PTH, intact; Future; Expected date: 09/09/2020  -     Tacrolimus level; Future; Expected date: 09/09/2020  -     Thyroid peroxidase antibody; Future; Expected date: 09/09/2020  -     Vitamin D; Future; Expected date: 09/09/2020  -     Urinalysis    Other orders  -     irbesartan (AVAPRO) 150 MG tablet; Take 1 tablet (150 mg total) by mouth once daily.  Dispense: 90 tablet; Refill: 3    25 minutes was spent face to face, with over half in counseling.        Risks, benefits, and side effects were discussed with the patient. All questions were answered to the fullest satisfaction of the patient, and pt verbalized understanding and agreement to treatment plan. Pt was to call with any new or worsening symptoms, or present to the ER.

## 2020-09-18 ENCOUNTER — TELEPHONE (OUTPATIENT)
Dept: FAMILY MEDICINE | Facility: CLINIC | Age: 65
End: 2020-09-18

## 2020-09-18 ENCOUNTER — LAB VISIT (OUTPATIENT)
Dept: LAB | Facility: HOSPITAL | Age: 65
End: 2020-09-18
Attending: FAMILY MEDICINE
Payer: MEDICARE

## 2020-09-18 DIAGNOSIS — D68.62 LUPUS ANTICOAGULANT DISORDER: ICD-10-CM

## 2020-09-18 DIAGNOSIS — I10 ESSENTIAL HYPERTENSION: ICD-10-CM

## 2020-09-18 DIAGNOSIS — R73.9 HYPERGLYCEMIA: ICD-10-CM

## 2020-09-18 DIAGNOSIS — E55.9 VITAMIN D DEFICIENCY: ICD-10-CM

## 2020-09-18 DIAGNOSIS — H25.10 NUCLEAR SCLEROSIS, UNSPECIFIED LATERALITY: ICD-10-CM

## 2020-09-18 DIAGNOSIS — N18.6 END STAGE RENAL DISEASE: Primary | ICD-10-CM

## 2020-09-18 DIAGNOSIS — H60.62 CHRONIC OTITIS EXTERNA OF LEFT EAR, UNSPECIFIED TYPE: ICD-10-CM

## 2020-09-18 DIAGNOSIS — E53.8 VITAMIN B 12 DEFICIENCY: ICD-10-CM

## 2020-09-18 DIAGNOSIS — M46.1 SACROILIITIS, NOT ELSEWHERE CLASSIFIED: ICD-10-CM

## 2020-09-18 DIAGNOSIS — N18.6 END STAGE RENAL DISEASE: ICD-10-CM

## 2020-09-18 LAB
ALBUMIN SERPL BCP-MCNC: 4.2 G/DL (ref 3.5–5.2)
ALP SERPL-CCNC: 54 U/L (ref 55–135)
ALT SERPL W/O P-5'-P-CCNC: 18 U/L (ref 10–44)
ANION GAP SERPL CALC-SCNC: 13 MMOL/L (ref 8–16)
AST SERPL-CCNC: 20 U/L (ref 10–40)
BASOPHILS # BLD AUTO: 0.04 K/UL (ref 0–0.2)
BASOPHILS NFR BLD: 0.4 % (ref 0–1.9)
BILIRUB SERPL-MCNC: 0.4 MG/DL (ref 0.1–1)
BUN SERPL-MCNC: 24 MG/DL (ref 8–23)
CALCIUM SERPL-MCNC: 9 MG/DL (ref 8.7–10.5)
CHLORIDE SERPL-SCNC: 101 MMOL/L (ref 95–110)
CHOLEST SERPL-MCNC: 177 MG/DL (ref 120–199)
CHOLEST/HDLC SERPL: 2.8 {RATIO} (ref 2–5)
CO2 SERPL-SCNC: 23 MMOL/L (ref 23–29)
CREAT SERPL-MCNC: 0.9 MG/DL (ref 0.5–1.4)
DIFFERENTIAL METHOD: ABNORMAL
EOSINOPHIL # BLD AUTO: 0.1 K/UL (ref 0–0.5)
EOSINOPHIL NFR BLD: 0.7 % (ref 0–8)
ERYTHROCYTE [DISTWIDTH] IN BLOOD BY AUTOMATED COUNT: 13.2 % (ref 11.5–14.5)
EST. GFR  (AFRICAN AMERICAN): >60 ML/MIN/1.73 M^2
EST. GFR  (NON AFRICAN AMERICAN): >60 ML/MIN/1.73 M^2
ESTIMATED AVG GLUCOSE: 117 MG/DL (ref 68–131)
GLUCOSE SERPL-MCNC: 111 MG/DL (ref 70–110)
HBA1C MFR BLD HPLC: 5.7 % (ref 4.5–6.2)
HCT VFR BLD AUTO: 39.3 % (ref 37–48.5)
HDLC SERPL-MCNC: 64 MG/DL (ref 40–75)
HDLC SERPL: 36.2 % (ref 20–50)
HGB BLD-MCNC: 12.9 G/DL (ref 12–16)
IMM GRANULOCYTES # BLD AUTO: 0.06 K/UL (ref 0–0.04)
IMM GRANULOCYTES NFR BLD AUTO: 0.6 % (ref 0–0.5)
LDLC SERPL CALC-MCNC: 91.8 MG/DL (ref 63–159)
LYMPHOCYTES # BLD AUTO: 0.8 K/UL (ref 1–4.8)
LYMPHOCYTES NFR BLD: 8.7 % (ref 18–48)
MCH RBC QN AUTO: 28.5 PG (ref 27–31)
MCHC RBC AUTO-ENTMCNC: 32.8 G/DL (ref 32–36)
MCV RBC AUTO: 87 FL (ref 82–98)
MONOCYTES # BLD AUTO: 0.3 K/UL (ref 0.3–1)
MONOCYTES NFR BLD: 3.4 % (ref 4–15)
NEUTROPHILS # BLD AUTO: 8.2 K/UL (ref 1.8–7.7)
NEUTROPHILS NFR BLD: 86.2 % (ref 38–73)
NONHDLC SERPL-MCNC: 113 MG/DL
NRBC BLD-RTO: 0 /100 WBC
PLATELET # BLD AUTO: 217 K/UL (ref 150–350)
PMV BLD AUTO: 10 FL (ref 9.2–12.9)
POTASSIUM SERPL-SCNC: 3.9 MMOL/L (ref 3.5–5.1)
PROT SERPL-MCNC: 7.7 G/DL (ref 6–8.4)
RBC # BLD AUTO: 4.52 M/UL (ref 4–5.4)
SODIUM SERPL-SCNC: 137 MMOL/L (ref 136–145)
T4 FREE SERPL-MCNC: 1.16 NG/DL (ref 0.71–1.51)
TRIGL SERPL-MCNC: 106 MG/DL (ref 30–150)
TSH SERPL DL<=0.005 MIU/L-ACNC: 1.5 UIU/ML (ref 0.34–5.6)
WBC # BLD AUTO: 9.53 K/UL (ref 3.9–12.7)

## 2020-09-18 PROCEDURE — 83036 HEMOGLOBIN GLYCOSYLATED A1C: CPT

## 2020-09-18 PROCEDURE — 36415 COLL VENOUS BLD VENIPUNCTURE: CPT

## 2020-09-18 PROCEDURE — 83970 ASSAY OF PARATHORMONE: CPT

## 2020-09-18 PROCEDURE — 80053 COMPREHEN METABOLIC PANEL: CPT

## 2020-09-18 PROCEDURE — 80197 ASSAY OF TACROLIMUS: CPT

## 2020-09-18 PROCEDURE — 82306 VITAMIN D 25 HYDROXY: CPT

## 2020-09-18 PROCEDURE — 84443 ASSAY THYROID STIM HORMONE: CPT

## 2020-09-18 PROCEDURE — 84439 ASSAY OF FREE THYROXINE: CPT

## 2020-09-18 PROCEDURE — 85025 COMPLETE CBC W/AUTO DIFF WBC: CPT

## 2020-09-18 PROCEDURE — 80061 LIPID PANEL: CPT

## 2020-09-18 PROCEDURE — 84480 ASSAY TRIIODOTHYRONINE (T3): CPT

## 2020-09-19 LAB
25(OH)D3+25(OH)D2 SERPL-MCNC: 37 NG/ML (ref 30–96)
PTH-INTACT SERPL-MCNC: 73 PG/ML (ref 9–77)
T3 SERPL-MCNC: 98 NG/DL (ref 60–180)
TACROLIMUS BLD-MCNC: 4.1 NG/ML (ref 5–15)
THYROPEROXIDASE IGG SERPL-ACNC: <6 IU/ML

## 2020-09-22 ENCOUNTER — PATIENT MESSAGE (OUTPATIENT)
Dept: FAMILY MEDICINE | Facility: CLINIC | Age: 65
End: 2020-09-22

## 2020-10-08 ENCOUNTER — PATIENT OUTREACH (OUTPATIENT)
Dept: ADMINISTRATIVE | Facility: OTHER | Age: 65
End: 2020-10-08

## 2020-10-08 NOTE — PROGRESS NOTES
Chart was reviewed for overdue [facility]active [facility] Encounters (TOMMY)  topics  Updates were requested from care everywhere  Health Maintenance has been updated  LINKS immunization registry triggered

## 2020-10-12 ENCOUNTER — OFFICE VISIT (OUTPATIENT)
Dept: CARDIOLOGY | Facility: CLINIC | Age: 65
End: 2020-10-12
Payer: MEDICARE

## 2020-10-12 VITALS
HEIGHT: 63 IN | BODY MASS INDEX: 47.84 KG/M2 | HEART RATE: 77 BPM | RESPIRATION RATE: 16 BRPM | OXYGEN SATURATION: 97 % | SYSTOLIC BLOOD PRESSURE: 148 MMHG | WEIGHT: 270 LBS | TEMPERATURE: 98 F | DIASTOLIC BLOOD PRESSURE: 88 MMHG

## 2020-10-12 DIAGNOSIS — Z86.73 HISTORY OF TIA (TRANSIENT ISCHEMIC ATTACK): ICD-10-CM

## 2020-10-12 DIAGNOSIS — I51.7 CARDIOMEGALY: ICD-10-CM

## 2020-10-12 DIAGNOSIS — R00.1 BRADYCARDIA: ICD-10-CM

## 2020-10-12 DIAGNOSIS — Z94.0 RENAL TRANSPLANT RECIPIENT: ICD-10-CM

## 2020-10-12 DIAGNOSIS — M81.0 OSTEOPOROSIS, UNSPECIFIED OSTEOPOROSIS TYPE, UNSPECIFIED PATHOLOGICAL FRACTURE PRESENCE: ICD-10-CM

## 2020-10-12 DIAGNOSIS — Z99.81 ON HOME O2: ICD-10-CM

## 2020-10-12 DIAGNOSIS — Z91.89 SEDENTARY LIFESTYLE: ICD-10-CM

## 2020-10-12 DIAGNOSIS — G47.09 OTHER INSOMNIA: ICD-10-CM

## 2020-10-12 DIAGNOSIS — F11.29 OPIOID DEPENDENCE WITH OPIOID-INDUCED DISORDER: ICD-10-CM

## 2020-10-12 DIAGNOSIS — R53.83 EXHAUSTION: Primary | ICD-10-CM

## 2020-10-12 DIAGNOSIS — E66.01 MORBID OBESITY: ICD-10-CM

## 2020-10-12 DIAGNOSIS — R79.89 PRERENAL AZOTEMIA: ICD-10-CM

## 2020-10-12 DIAGNOSIS — G47.19 EXCESSIVE DAYTIME SLEEPINESS: ICD-10-CM

## 2020-10-12 DIAGNOSIS — M32.19 OTHER SYSTEMIC LUPUS ERYTHEMATOSUS WITH OTHER ORGAN INVOLVEMENT: ICD-10-CM

## 2020-10-12 DIAGNOSIS — F43.9 STRESS AT HOME: ICD-10-CM

## 2020-10-12 DIAGNOSIS — R94.31 NONSPECIFIC ABNORMAL ELECTROCARDIOGRAM (ECG) (EKG): ICD-10-CM

## 2020-10-12 DIAGNOSIS — G47.33 OSA (OBSTRUCTIVE SLEEP APNEA): ICD-10-CM

## 2020-10-12 DIAGNOSIS — R06.09 DOE (DYSPNEA ON EXERTION): ICD-10-CM

## 2020-10-12 DIAGNOSIS — G89.4 CHRONIC PAIN SYNDROME: ICD-10-CM

## 2020-10-12 DIAGNOSIS — E88.2 DERCUM'S DISEASE: ICD-10-CM

## 2020-10-12 PROBLEM — M32.8 OTHER FORMS OF SYSTEMIC LUPUS ERYTHEMATOSUS: Status: ACTIVE | Noted: 2020-10-12

## 2020-10-12 PROCEDURE — 99999 PR PBB SHADOW E&M-EST. PATIENT-LVL IV: CPT | Mod: PBBFAC,,, | Performed by: INTERNAL MEDICINE

## 2020-10-12 PROCEDURE — 99214 OFFICE O/P EST MOD 30 MIN: CPT | Mod: PBBFAC | Performed by: INTERNAL MEDICINE

## 2020-10-12 PROCEDURE — 99999 PR PBB SHADOW E&M-EST. PATIENT-LVL IV: ICD-10-PCS | Mod: PBBFAC,,, | Performed by: INTERNAL MEDICINE

## 2020-10-12 PROCEDURE — 93005 ELECTROCARDIOGRAM TRACING: CPT

## 2020-10-12 NOTE — LETTER
October 12, 2020      Lavell Moreno MD  [address]  [place] [address]           [facility] - Cardiology  [address] [facility] [place] [address]  Phone: [phone]  Fax: [phone]          Patient: Alida Chacon   MR Number: [id number]   YOB: 1955   Date of Visit: 10/12/2020       Dear Dr. Lavell Moreno:    Thank you for referring Alida Chacon to me for evaluation. Attached you will find relevant portions of my assessment and plan of care.    If you have questions, please do not hesitate to call me. I look forward to following Alida Chacon along with you.    Sincerely,    Koffi Schafer MD    Enclosure  CC:  No Recipients    If you would like to receive this communication electronically, please contact [email or web address] or [phone] to request more information on [facility] [employer] access.    For providers and/or their staff who would like to refer a patient to [facility], please contact us through our one-stop-shop provider referral line, [facility], at [phone].    If you feel you have received this communication in error or would no longer like to receive these types of communications, please e-mail [email or web address]

## 2020-10-12 NOTE — PATIENT INSTRUCTIONS

## 2020-10-12 NOTE — PROGRESS NOTES
" Patient ID:  Alida Chacon is a 64 y.o. female who presents to Establish Care  - Referral MD Edenilson for "other CP", history of a "misshapen heart" after [facility] 2009, multiple co-morbidities  Prior cardiologist: Dr. Vasquez in [facility], last seen 2015, did annual sleep study and Echos  Lives with , Juancarlos, non-smoker  Disabled due to multiple medical problems, prior RN, pediatric and hospice    Patient is a new patient to me.    Health literacy: medium  Vaccinations: up-to-date  Activities: Used to go to the gym but no longer can r/t SOB, sedentary since 3/2020, now very limited, trouble even with ADL due to feeling of exhaustion  Nicotine: Never  Alcohol: None  Illicit drugs: topical marijuana - very helpful  Cardiac symptoms: bradycardia with lightheaded, saw rates of 50s   Home BP: Yes - WNL, 110/60  Medication compliance: Yes   Diet: regular  Caffeine: 2 cpd  Labs:   Lab Results   Component Value Date    TSH 1.505 09/18/2020        Lab Results   Component Value Date    HGBA1C 5.7 09/18/2020       Lab Results   Component Value Date    WBC 9.53 09/18/2020    HGB 12.9 09/18/2020    HCT 39.3 09/18/2020    MCV 87 09/18/2020     09/18/2020       CMP  Sodium   Date Value Ref Range Status   09/18/2020 137 136 - 145 mmol/L Final     Potassium   Date Value Ref Range Status   09/18/2020 3.9 3.5 - 5.1 mmol/L Final     Chloride   Date Value Ref Range Status   09/18/2020 101 95 - 110 mmol/L Final     CO2   Date Value Ref Range Status   09/18/2020 23 23 - 29 mmol/L Final     Glucose   Date Value Ref Range Status   09/18/2020 111 (H) 70 - 110 mg/dL Final     BUN, Bld   Date Value Ref Range Status   09/18/2020 24 (H) 8 - 23 mg/dL Final     Creatinine   Date Value Ref Range Status   09/18/2020 0.9 0.5 - 1.4 mg/dL Final     Calcium   Date Value Ref Range Status   09/18/2020 9.0 8.7 - 10.5 mg/dL Final     Total Protein   Date Value Ref Range Status   09/18/2020 7.7 6.0 - 8.4 g/dL Final     Albumin   Date Value Ref " "Range Status   09/18/2020 4.2 3.5 - 5.2 g/dL Final     Total Bilirubin   Date Value Ref Range Status   09/18/2020 0.4 0.1 - 1.0 mg/dL Final     Comment:     For infants and newborns, interpretation of results should be based  on gestational age, weight and in agreement with clinical  observations.  Premature Infant recommended reference ranges:  Up to 24 hours.............<8.0 mg/dL  Up to 48 hours............<12.0 mg/dL  3-5 days..................<15.0 mg/dL  6-29 days.................<15.0 mg/dL       Alkaline Phosphatase   Date Value Ref Range Status   09/18/2020 54 (L) 55 - 135 U/L Final     AST   Date Value Ref Range Status   09/18/2020 20 10 - 40 U/L Final     ALT   Date Value Ref Range Status   09/18/2020 18 10 - 44 U/L Final     Anion Gap   Date Value Ref Range Status   09/18/2020 13 8 - 16 mmol/L Final     eGFR if    Date Value Ref Range Status   09/18/2020 >60.0 >60 mL/min/1.73 m^2 Final     eGFR if non    Date Value Ref Range Status   09/18/2020 >60.0 >60 mL/min/1.73 m^2 Final     Comment:     Calculation used to obtain the estimated glomerular filtration  rate (eGFR) is the CKD-EPI equation.        @labrcntip(troponini)@  No results found for: BNP}   Lab Results   Component Value Date    CHOL 177 09/18/2020    CHOL 179 04/17/2019     Lab Results   Component Value Date    HDL 64 09/18/2020    HDL 67 04/17/2019     Lab Results   Component Value Date    LDLCALC 91.8 09/18/2020    LDLCALC 89.6 04/17/2019     Lab Results   Component Value Date    TRIG 106 09/18/2020    TRIG 112 04/17/2019     Lab Results   Component Value Date    CHOLHDL 36.2 09/18/2020    CHOLHDL 37.4 04/17/2019      Last Echo: 2015  Last stress test: 2015   Cardiovascular angiogram: 2009   ECG: NSR, rate 62, possible IMI  Fundoscopic exam: 2017, no retinopathy noted    WF with multiple medical issue, refer for "other CP" and history of "misshaping heart" after [facility] in [place]. Now feels exhausted with very limited " "ADL. No recent change in medications but have been sedentary since the COVID quarantine. Weight up 35 lbs over this time. Have a number of other issue including chronic opioid use, max at 160 mg of Oxycodone and now self tapering down to 40 mg daily. Also on chronic steroid treatment post renal transplant.     Dr. BLANCA Moreno noted "Other chest pain  -     Ambulatory referral/consult to Cardiology;"    CXR 1/2020 - The lungs are clear, with normal appearance of pulmonary vasculature and no pleural effusion or pneumothorax.     The cardiac silhouette is mildly enlarged.  The hilar and mediastinal contours are unremarkable.      Review of Systems   Constitution: Positive for decreased appetite, malaise/fatigue, night sweats and weight gain (Gained 35# in the last 6 months). Negative for diaphoresis and fever.        Neck 16;  Waist 53;  Hip 48     HENT: Positive for congestion, ear discharge, ear pain, hearing loss, hoarse voice, nosebleeds, odynophagia, stridor and tinnitus.    Eyes: Positive for blurred vision, discharge, double vision, pain, photophobia, redness, vision loss in left eye, vision loss in right eye and visual disturbance.   Cardiovascular: Positive for chest pain, cyanosis, dyspnea on exertion, irregular heartbeat, near-syncope and palpitations. Negative for claudication, leg swelling, orthopnea and paroxysmal nocturnal dyspnea.   Respiratory: Positive for cough and shortness of breath. Negative for sleep disturbances due to breathing, snoring and wheezing.         [place] = 9, not on Rx, unable to tolerate CPAP, on nocturnal O2   Endocrine: Positive for cold intolerance and heat intolerance. Negative for polydipsia and polyuria.   Hematologic/Lymphatic: Positive for bleeding problem. Bruises/bleeds easily.   Skin: Positive for color change, dry skin, flushing, itching, nail changes, poor wound healing, rash, suspicious lesions and unusual hair distribution.   Musculoskeletal: Positive for arthritis, " "back pain, falls, joint pain, joint swelling, muscle cramps, muscle weakness, myalgias, neck pain and stiffness. Negative for gout.   Gastrointestinal: Positive for bloating, abdominal pain, change in bowel habit, diarrhea and nausea. Negative for heartburn, hematemesis, hematochezia and melena.   Genitourinary: Positive for decreased libido, frequency and non-menstrual bleeding.   Neurological: Positive for brief paralysis, difficulty with concentration, disturbances in coordination, excessive daytime sleepiness, dizziness, focal weakness, headaches, light-headedness, loss of balance, numbness, tremors, vertigo and weakness.   Psychiatric/Behavioral: Positive for depression. Negative for substance abuse. The patient has insomnia. The patient is not nervous/anxious.    Allergic/Immunologic: Positive for environmental allergies, hives and persistent infections.        Objective:    Physical Exam   Constitutional: She is oriented to person, place, and time. She appears well-developed and well-nourished.   HENT:   Head: Normocephalic.   Eyes: Pupils are equal, round, and reactive to light. Conjunctivae and EOM are normal.   Neck: Normal range of motion. Neck supple. No JVD present. No thyromegaly present.   Circumference 16"   Cardiovascular: Normal rate, regular rhythm and intact distal pulses. Exam reveals distant heart sounds. Exam reveals no gallop and no friction rub.   No murmur heard.  Pulses:       Carotid pulses are 1+ on the right side and 1+ on the left side.       Radial pulses are 1+ on the right side and 1+ on the left side.        Femoral pulses are 1+ on the right side and 1+ on the left side.       Popliteal pulses are 1+ on the right side and 1+ on the left side.        Dorsalis pedis pulses are 1+ on the right side and 1+ on the left side.        Posterior tibial pulses are 1+ on the right side and 1+ on the left side.   Pulmonary/Chest: Effort normal and breath sounds normal. She has no rales. She " "exhibits no tenderness.   Abdominal: Soft. Bowel sounds are normal. There is no abdominal tenderness.   Waist 53", hip 48"   Musculoskeletal: Normal range of motion.         General: No edema.   Lymphadenopathy:     She has no cervical adenopathy.   Neurological: She is alert and oriented to person, place, and time.   Skin: Skin is warm and dry. No rash noted.         Assessment:       1. Exhaustion, onset end of 9/2020    2. Sedentary lifestyle    3. Opioid dependence with opioid-induced disorder, started Rx 1991, max 160 mg of Oxycodone    4. Chronic pain syndrome    5. Morbid obesity    6. Other systemic lupus erythematosus with other organ involvement    7. Dercum's disease    8. Nonspecific abnormal electrocardiogram (ECG) (EKG)    9. History of TIA (transient ischemic attack), 2005, left-sided numbness    10. Cardiomegaly    11. Renal transplant recipient, 2011    12. Other insomnia    13. Bradycardia, onset end of 9/2020    14. Prerenal azotemia    15. Excessive daytime sleepiness    16. CHARLY (obstructive sleep apnea), not on Rx, unable to tolerate the mask    17. On home O2, night time use    18. Osteoporosis, unspecified osteoporosis type, unspecified pathological fracture presence    19. Stress at home    20. CANTU (dyspnea on exertion), progressive for last 2 weeks         Plan:         Exhaustion, onset end of 9/2020  -     Holter monitor - 48 hour; Future  -     Echo Color Flow Doppler? Yes    Sedentary lifestyle    Opioid dependence with opioid-induced disorder, started Rx 1991, max 160 mg of Oxycodone    Chronic pain syndrome    Morbid obesity    Other systemic lupus erythematosus with other organ involvement    Dercum's disease    Nonspecific abnormal electrocardiogram (ECG) (EKG)    History of TIA (transient ischemic attack), 2005, left-sided numbness    Cardiomegaly  -     Echo Color Flow Doppler? Yes    Renal transplant recipient, 2011    Other insomnia    Bradycardia, onset end of 9/2020  -     " Holter monitor - 48 hour; Future    Prerenal azotemia    Excessive daytime sleepiness    CHARLY (obstructive sleep apnea), not on Rx, unable to tolerate the mask    On home O2, night time use    Osteoporosis, unspecified osteoporosis type, unspecified pathological fracture presence    Stress at home    CANTU (dyspnea on exertion), progressive for last 2 weeks  -     Echo Color Flow Doppler? Yes    - All medical issues reviewed, continue current Rx.  - Need review with sleep disorder doctors  - CBT, cognitive behavior therapy for sleep disorder, etc  - Highly recommend Yoga, Pascual-Chi and or meditation  - Need to remain well hydrated but avoid drinking within 4 hours of bedtime.  - CV status stable, all medications reviewed, patient acknowledge good understanding.  - Recommend healthy living: no nicotine, moderate alcohol, healthy diet and regular exercise aiming for fitness, and weight control  - Need good exercise program, 4 key elements: 1. Aerobic (walking, swimming, dancing, jogging, biking, etc, 2. Muscle strengthening / resistance exercise, need to do 2-3 times weekly, 3. Stretching daily, good stretch takes a whole  total minute. 4. Balance exercise daily.   - Encourage activities as much as tolerated. Any activity is better than none!  - Instruction for Mediterranean diet and heart healthy exercise given.  - Check home blood pressure, 2 days weekly, do 2 readings within 5 minutes in AM and PM, keep log for review.  - Weigh twice weekly, try to lose 1-2 lbs per week. Target weight loss of 5%-10%.  - Highly recommend 30 minutes of exercise / activities daily, can have Sunday off, with 2-3 sessions of muscle strengthening weekly. A  would be very helpful.  - Recommend at least annual cardiovascular evaluation in view of patient's significant risk factors.  - Phone review / encourage use of [facility]    Greater than 50% of the time was spent in counseling and coordination of care. The above assessment  and plan have been discussed at length. Referring provider's note reviewed. Labs and procedure over the last 6 months reviewed. Problem List updated. Asked to bring in all active medications / pills bottles with next visit.

## 2020-12-28 RX ORDER — ERGOCALCIFEROL 1.25 MG/1
CAPSULE ORAL
Qty: 12 CAPSULE | Refills: 5 | Status: SHIPPED | OUTPATIENT
Start: 2020-12-28

## 2020-12-28 RX ORDER — IRBESARTAN 150 MG/1
150 TABLET ORAL DAILY
Qty: 90 TABLET | Refills: 3 | Status: SHIPPED | OUTPATIENT
Start: 2020-12-28 | End: 2021-06-15 | Stop reason: SDUPTHER

## 2020-12-28 RX ORDER — ALENDRONATE SODIUM 70 MG/1
TABLET ORAL
Qty: 12 TABLET | Refills: 3 | Status: SHIPPED | OUTPATIENT
Start: 2020-12-28 | End: 2021-06-15 | Stop reason: SDUPTHER

## 2020-12-28 RX ORDER — WARFARIN SODIUM 5 MG/1
TABLET ORAL
Qty: 90 TABLET | Refills: 9 | Status: SHIPPED | OUTPATIENT
Start: 2020-12-28 | End: 2021-06-15 | Stop reason: SDUPTHER

## 2020-12-28 RX ORDER — PREDNISONE 5 MG/1
TABLET ORAL
Qty: 90 TABLET | Refills: 3 | Status: SHIPPED | OUTPATIENT
Start: 2020-12-28 | End: 2021-06-15 | Stop reason: SDUPTHER

## 2020-12-28 RX ORDER — OMEPRAZOLE 40 MG/1
CAPSULE, DELAYED RELEASE ORAL
Qty: 90 CAPSULE | Refills: 3 | Status: SHIPPED | OUTPATIENT
Start: 2020-12-28 | End: 2021-06-15 | Stop reason: SDUPTHER

## 2021-01-27 ENCOUNTER — OFFICE VISIT (OUTPATIENT)
Dept: FAMILY MEDICINE | Facility: CLINIC | Age: 66
End: 2021-01-27
Payer: MEDICARE

## 2021-01-27 VITALS
TEMPERATURE: 98 F | WEIGHT: 275 LBS | SYSTOLIC BLOOD PRESSURE: 128 MMHG | HEIGHT: 63 IN | BODY MASS INDEX: 48.73 KG/M2 | OXYGEN SATURATION: 97 % | RESPIRATION RATE: 19 BRPM | DIASTOLIC BLOOD PRESSURE: 68 MMHG | HEART RATE: 72 BPM

## 2021-01-27 DIAGNOSIS — Z79.899 HIGH RISK MEDICATION USE: ICD-10-CM

## 2021-01-27 DIAGNOSIS — M54.2 NECK PAIN: ICD-10-CM

## 2021-01-27 DIAGNOSIS — I10 ESSENTIAL HYPERTENSION: ICD-10-CM

## 2021-01-27 DIAGNOSIS — R53.83 FATIGUE, UNSPECIFIED TYPE: ICD-10-CM

## 2021-01-27 DIAGNOSIS — N18.6 END STAGE RENAL DISEASE: Primary | ICD-10-CM

## 2021-01-27 DIAGNOSIS — E55.9 VITAMIN D DEFICIENCY: ICD-10-CM

## 2021-01-27 DIAGNOSIS — M79.671 RIGHT FOOT PAIN: ICD-10-CM

## 2021-01-27 DIAGNOSIS — R07.89 OTHER CHEST PAIN: ICD-10-CM

## 2021-01-27 PROCEDURE — 99214 OFFICE O/P EST MOD 30 MIN: CPT | Mod: S$PBB,,, | Performed by: FAMILY MEDICINE

## 2021-01-27 PROCEDURE — 99214 PR OFFICE/OUTPT VISIT, EST, LEVL IV, 30-39 MIN: ICD-10-PCS | Mod: S$PBB,,, | Performed by: FAMILY MEDICINE

## 2021-01-27 PROCEDURE — 99999 PR PBB SHADOW E&M-EST. PATIENT-LVL V: ICD-10-PCS | Mod: PBBFAC,,, | Performed by: FAMILY MEDICINE

## 2021-01-27 PROCEDURE — 99999 PR PBB SHADOW E&M-EST. PATIENT-LVL V: CPT | Mod: PBBFAC,,, | Performed by: FAMILY MEDICINE

## 2021-01-27 PROCEDURE — 99215 OFFICE O/P EST HI 40 MIN: CPT | Mod: PBBFAC,PN | Performed by: FAMILY MEDICINE

## 2021-02-03 ENCOUNTER — HOSPITAL ENCOUNTER (OUTPATIENT)
Dept: RADIOLOGY | Facility: HOSPITAL | Age: 66
Discharge: HOME | End: 2021-02-03
Attending: FAMILY MEDICINE
Payer: MEDICARE

## 2021-02-03 DIAGNOSIS — M79.671 RIGHT FOOT PAIN: ICD-10-CM

## 2021-02-03 DIAGNOSIS — M54.2 NECK PAIN: ICD-10-CM

## 2021-02-03 PROCEDURE — 72050 X-RAY EXAM NECK SPINE 4/5VWS: CPT | Mod: TC,PN

## 2021-02-03 PROCEDURE — 72050 X-RAY EXAM NECK SPINE 4/5VWS: CPT | Mod: 26,,, | Performed by: RADIOLOGY

## 2021-02-03 PROCEDURE — 73630 X-RAY EXAM OF FOOT: CPT | Mod: 26,RT,, | Performed by: RADIOLOGY

## 2021-02-03 PROCEDURE — 73630 XR FOOT COMPLETE 3 VIEW RIGHT: ICD-10-PCS | Mod: 26,RT,, | Performed by: RADIOLOGY

## 2021-02-03 PROCEDURE — 72050 XR CERVICAL SPINE COMPLETE 5 VIEW: ICD-10-PCS | Mod: 26,,, | Performed by: RADIOLOGY

## 2021-02-03 PROCEDURE — 73630 X-RAY EXAM OF FOOT: CPT | Mod: TC,PN,RT

## 2021-04-08 ENCOUNTER — PATIENT MESSAGE (OUTPATIENT)
Dept: FAMILY MEDICINE | Facility: CLINIC | Age: 66
End: 2021-04-08

## 2021-04-08 DIAGNOSIS — Z94.0 KIDNEY TRANSPLANT RECIPIENT: ICD-10-CM

## 2021-04-08 RX ORDER — TACROLIMUS 1 MG/1
3 CAPSULE ORAL EVERY 12 HOURS
Qty: 540 CAPSULE | Refills: 3 | Status: CANCELLED | OUTPATIENT
Start: 2021-04-08 | End: 2022-04-08

## 2021-04-09 RX ORDER — TACROLIMUS 1 MG/1
3 CAPSULE ORAL EVERY 12 HOURS
Qty: 540 CAPSULE | Refills: 3 | Status: SHIPPED | OUTPATIENT
Start: 2021-04-09 | End: 2022-04-09

## 2021-05-18 ENCOUNTER — PATIENT MESSAGE (OUTPATIENT)
Dept: ADMINISTRATIVE | Facility: HOSPITAL | Age: 66
End: 2021-05-18

## 2021-05-25 ENCOUNTER — PATIENT MESSAGE (OUTPATIENT)
Dept: FAMILY MEDICINE | Facility: CLINIC | Age: 66
End: 2021-05-25

## 2021-05-25 DIAGNOSIS — R10.84 GENERALIZED ABDOMINAL PAIN: Primary | ICD-10-CM

## 2021-05-26 ENCOUNTER — NURSE TRIAGE (OUTPATIENT)
Dept: ADMINISTRATIVE | Facility: CLINIC | Age: 66
End: 2021-05-26

## 2021-05-26 ENCOUNTER — HOSPITAL ENCOUNTER (EMERGENCY)
Facility: HOSPITAL | Age: 66
Discharge: LEFT BEFORE DISCHARGE | End: 2021-05-27
Attending: EMERGENCY MEDICINE
Payer: MEDICARE

## 2021-05-26 VITALS
DIASTOLIC BLOOD PRESSURE: 83 MMHG | SYSTOLIC BLOOD PRESSURE: 153 MMHG | OXYGEN SATURATION: 92 % | HEIGHT: 63 IN | WEIGHT: 270 LBS | RESPIRATION RATE: 16 BRPM | TEMPERATURE: 98 F | HEART RATE: 72 BPM | BODY MASS INDEX: 47.84 KG/M2

## 2021-05-26 DIAGNOSIS — N39.0 URINARY TRACT INFECTION WITHOUT HEMATURIA, SITE UNSPECIFIED: Primary | ICD-10-CM

## 2021-05-26 LAB
ALBUMIN SERPL BCP-MCNC: 3.8 G/DL (ref 3.5–5.2)
ALP SERPL-CCNC: 61 U/L (ref 55–135)
ALT SERPL W/O P-5'-P-CCNC: 20 U/L (ref 10–44)
ANION GAP SERPL CALC-SCNC: 13 MMOL/L (ref 8–16)
AST SERPL-CCNC: 21 U/L (ref 10–40)
BACTERIA #/AREA URNS HPF: ABNORMAL /HPF
BASOPHILS # BLD AUTO: 0.03 K/UL (ref 0–0.2)
BASOPHILS NFR BLD: 0.3 % (ref 0–1.9)
BILIRUB SERPL-MCNC: 0.2 MG/DL (ref 0.1–1)
BILIRUB UR QL STRIP: NEGATIVE
BUN SERPL-MCNC: 22 MG/DL (ref 8–23)
CALCIUM SERPL-MCNC: 8.9 MG/DL (ref 8.7–10.5)
CHLORIDE SERPL-SCNC: 103 MMOL/L (ref 95–110)
CLARITY UR: ABNORMAL
CO2 SERPL-SCNC: 23 MMOL/L (ref 23–29)
COLOR UR: YELLOW
CREAT SERPL-MCNC: 1 MG/DL (ref 0.5–1.4)
DIFFERENTIAL METHOD: ABNORMAL
EOSINOPHIL # BLD AUTO: 0.2 K/UL (ref 0–0.5)
EOSINOPHIL NFR BLD: 1.6 % (ref 0–8)
ERYTHROCYTE [DISTWIDTH] IN BLOOD BY AUTOMATED COUNT: 13.1 % (ref 11.5–14.5)
EST. GFR  (AFRICAN AMERICAN): >60 ML/MIN/1.73 M^2
EST. GFR  (NON AFRICAN AMERICAN): 59.7 ML/MIN/1.73 M^2
GLUCOSE SERPL-MCNC: 99 MG/DL (ref 70–110)
GLUCOSE UR QL STRIP: NEGATIVE
HCT VFR BLD AUTO: 37.3 % (ref 37–48.5)
HGB BLD-MCNC: 12.2 G/DL (ref 12–16)
HGB UR QL STRIP: ABNORMAL
IMM GRANULOCYTES # BLD AUTO: 0.07 K/UL (ref 0–0.04)
IMM GRANULOCYTES NFR BLD AUTO: 0.7 % (ref 0–0.5)
KETONES UR QL STRIP: NEGATIVE
LACTATE SERPL-SCNC: 1 MMOL/L (ref 0.5–2.2)
LEUKOCYTE ESTERASE UR QL STRIP: ABNORMAL
LIPASE SERPL-CCNC: 17 U/L (ref 4–60)
LYMPHOCYTES # BLD AUTO: 1.4 K/UL (ref 1–4.8)
LYMPHOCYTES NFR BLD: 13.6 % (ref 18–48)
MCH RBC QN AUTO: 27.8 PG (ref 27–31)
MCHC RBC AUTO-ENTMCNC: 32.7 G/DL (ref 32–36)
MCV RBC AUTO: 85 FL (ref 82–98)
MICROSCOPIC COMMENT: ABNORMAL
MONOCYTES # BLD AUTO: 0.5 K/UL (ref 0.3–1)
MONOCYTES NFR BLD: 4.4 % (ref 4–15)
NEUTROPHILS # BLD AUTO: 8.2 K/UL (ref 1.8–7.7)
NEUTROPHILS NFR BLD: 79.4 % (ref 38–73)
NITRITE UR QL STRIP: NEGATIVE
NRBC BLD-RTO: 0 /100 WBC
PH UR STRIP: 5 [PH] (ref 5–8)
PLATELET # BLD AUTO: 216 K/UL (ref 150–450)
PMV BLD AUTO: 9.2 FL (ref 9.2–12.9)
POTASSIUM SERPL-SCNC: 3.8 MMOL/L (ref 3.5–5.1)
PROT SERPL-MCNC: 7.1 G/DL (ref 6–8.4)
PROT UR QL STRIP: NEGATIVE
RBC # BLD AUTO: 4.39 M/UL (ref 4–5.4)
RBC #/AREA URNS HPF: 35 /HPF (ref 0–4)
SODIUM SERPL-SCNC: 139 MMOL/L (ref 136–145)
SP GR UR STRIP: 1.02 (ref 1–1.03)
SQUAMOUS #/AREA URNS HPF: 8 /HPF
URN SPEC COLLECT METH UR: ABNORMAL
UROBILINOGEN UR STRIP-ACNC: NEGATIVE EU/DL
WBC # BLD AUTO: 10.28 K/UL (ref 3.9–12.7)
WBC #/AREA URNS HPF: >100 /HPF (ref 0–5)

## 2021-05-26 PROCEDURE — 80053 COMPREHEN METABOLIC PANEL: CPT | Performed by: EMERGENCY MEDICINE

## 2021-05-26 PROCEDURE — 83690 ASSAY OF LIPASE: CPT | Performed by: EMERGENCY MEDICINE

## 2021-05-26 PROCEDURE — 96361 HYDRATE IV INFUSION ADD-ON: CPT

## 2021-05-26 PROCEDURE — 96375 TX/PRO/DX INJ NEW DRUG ADDON: CPT

## 2021-05-26 PROCEDURE — 74176 CT ABD & PELVIS W/O CONTRAST: CPT | Mod: TC

## 2021-05-26 PROCEDURE — 74176 CT ABDOMEN PELVIS WITHOUT CONTRAST: ICD-10-PCS | Mod: 26,,, | Performed by: RADIOLOGY

## 2021-05-26 PROCEDURE — 63600175 PHARM REV CODE 636 W HCPCS: Performed by: EMERGENCY MEDICINE

## 2021-05-26 PROCEDURE — 99285 EMERGENCY DEPT VISIT HI MDM: CPT | Mod: 25

## 2021-05-26 PROCEDURE — 96374 THER/PROPH/DIAG INJ IV PUSH: CPT

## 2021-05-26 PROCEDURE — 85025 COMPLETE CBC W/AUTO DIFF WBC: CPT | Performed by: EMERGENCY MEDICINE

## 2021-05-26 PROCEDURE — 81000 URINALYSIS NONAUTO W/SCOPE: CPT | Performed by: EMERGENCY MEDICINE

## 2021-05-26 PROCEDURE — 87086 URINE CULTURE/COLONY COUNT: CPT | Performed by: EMERGENCY MEDICINE

## 2021-05-26 PROCEDURE — 83605 ASSAY OF LACTIC ACID: CPT | Performed by: EMERGENCY MEDICINE

## 2021-05-26 PROCEDURE — 25000003 PHARM REV CODE 250: Performed by: EMERGENCY MEDICINE

## 2021-05-26 PROCEDURE — 74176 CT ABD & PELVIS W/O CONTRAST: CPT | Mod: 26,,, | Performed by: RADIOLOGY

## 2021-05-26 PROCEDURE — P9612 CATHETERIZE FOR URINE SPEC: HCPCS

## 2021-05-26 RX ORDER — NITROFURANTOIN 25; 75 MG/1; MG/1
100 CAPSULE ORAL
Status: COMPLETED | OUTPATIENT
Start: 2021-05-26 | End: 2021-05-26

## 2021-05-26 RX ORDER — ONDANSETRON 2 MG/ML
4 INJECTION INTRAMUSCULAR; INTRAVENOUS
Status: COMPLETED | OUTPATIENT
Start: 2021-05-26 | End: 2021-05-26

## 2021-05-26 RX ORDER — NITROFURANTOIN 25; 75 MG/1; MG/1
CAPSULE ORAL
Status: DISCONTINUED
Start: 2021-05-26 | End: 2021-05-27 | Stop reason: HOSPADM

## 2021-05-26 RX ORDER — HYDROMORPHONE HYDROCHLORIDE 2 MG/ML
1 INJECTION, SOLUTION INTRAMUSCULAR; INTRAVENOUS; SUBCUTANEOUS
Status: COMPLETED | OUTPATIENT
Start: 2021-05-26 | End: 2021-05-26

## 2021-05-26 RX ORDER — PHENAZOPYRIDINE HYDROCHLORIDE 100 MG/1
TABLET, FILM COATED ORAL
Status: DISCONTINUED
Start: 2021-05-26 | End: 2021-05-26 | Stop reason: WASHOUT

## 2021-05-26 RX ADMIN — SODIUM CHLORIDE 1000 ML: 0.9 INJECTION, SOLUTION INTRAVENOUS at 08:05

## 2021-05-26 RX ADMIN — HYDROMORPHONE HYDROCHLORIDE 1 MG: 2 INJECTION INTRAMUSCULAR; INTRAVENOUS; SUBCUTANEOUS at 07:05

## 2021-05-26 RX ADMIN — ONDANSETRON 4 MG: 2 INJECTION INTRAMUSCULAR; INTRAVENOUS at 07:05

## 2021-05-26 RX ADMIN — NITROFURANTOIN (MONOHYDRATE/MACROCRYSTALS) 100 MG: 75; 25 CAPSULE ORAL at 10:05

## 2021-05-27 ENCOUNTER — PATIENT MESSAGE (OUTPATIENT)
Dept: FAMILY MEDICINE | Facility: CLINIC | Age: 66
End: 2021-05-27

## 2021-05-27 RX ORDER — NITROFURANTOIN 25; 75 MG/1; MG/1
100 CAPSULE ORAL 2 TIMES DAILY
Qty: 14 CAPSULE | Refills: 0 | Status: SHIPPED | OUTPATIENT
Start: 2021-05-27 | End: 2021-06-03

## 2021-05-28 LAB
BACTERIA UR CULT: NORMAL
BACTERIA UR CULT: NORMAL

## 2021-05-31 ENCOUNTER — PATIENT MESSAGE (OUTPATIENT)
Dept: FAMILY MEDICINE | Facility: CLINIC | Age: 66
End: 2021-05-31

## 2021-06-02 ENCOUNTER — OFFICE VISIT (OUTPATIENT)
Dept: FAMILY MEDICINE | Facility: CLINIC | Age: 66
End: 2021-06-02
Payer: MEDICARE

## 2021-06-02 VITALS
HEIGHT: 63 IN | SYSTOLIC BLOOD PRESSURE: 142 MMHG | RESPIRATION RATE: 16 BRPM | WEIGHT: 263.44 LBS | HEART RATE: 68 BPM | BODY MASS INDEX: 46.68 KG/M2 | OXYGEN SATURATION: 94 % | DIASTOLIC BLOOD PRESSURE: 78 MMHG

## 2021-06-02 DIAGNOSIS — N81.4 UTERINE PROLAPSE: Chronic | ICD-10-CM

## 2021-06-02 DIAGNOSIS — N30.01 ACUTE CYSTITIS WITH HEMATURIA: Primary | ICD-10-CM

## 2021-06-02 LAB
BILIRUB SERPL-MCNC: ABNORMAL MG/DL
BLOOD URINE, POC: ABNORMAL
CLARITY, POC UA: ABNORMAL
COLOR, POC UA: ABNORMAL
GLUCOSE UR QL STRIP: ABNORMAL
KETONES UR QL STRIP: ABNORMAL
LEUKOCYTE ESTERASE URINE, POC: ABNORMAL
NITRITE, POC UA: ABNORMAL
PH, POC UA: 6
PROTEIN, POC: ABNORMAL
SPECIFIC GRAVITY, POC UA: 1.01
UROBILINOGEN, POC UA: 0.2

## 2021-06-02 PROCEDURE — 81002 URINALYSIS NONAUTO W/O SCOPE: CPT | Mod: PBBFAC,PN | Performed by: FAMILY MEDICINE

## 2021-06-02 PROCEDURE — 99999 PR PBB SHADOW E&M-EST. PATIENT-LVL V: CPT | Mod: PBBFAC,,, | Performed by: FAMILY MEDICINE

## 2021-06-02 PROCEDURE — 99999 PR PBB SHADOW E&M-EST. PATIENT-LVL V: ICD-10-PCS | Mod: PBBFAC,,, | Performed by: FAMILY MEDICINE

## 2021-06-02 PROCEDURE — 87086 URINE CULTURE/COLONY COUNT: CPT | Performed by: FAMILY MEDICINE

## 2021-06-02 PROCEDURE — 99215 OFFICE O/P EST HI 40 MIN: CPT | Mod: S$PBB,,, | Performed by: FAMILY MEDICINE

## 2021-06-02 PROCEDURE — 99215 OFFICE O/P EST HI 40 MIN: CPT | Mod: PBBFAC,PN | Performed by: FAMILY MEDICINE

## 2021-06-02 PROCEDURE — 99215 PR OFFICE/OUTPT VISIT, EST, LEVL V, 40-54 MIN: ICD-10-PCS | Mod: S$PBB,,, | Performed by: FAMILY MEDICINE

## 2021-06-02 RX ORDER — CLINDAMYCIN HYDROCHLORIDE 150 MG/1
150 CAPSULE ORAL 2 TIMES DAILY
Qty: 14 CAPSULE | Refills: 0 | Status: SHIPPED | OUTPATIENT
Start: 2021-06-02 | End: 2021-06-09

## 2021-06-03 LAB — BACTERIA UR CULT: NO GROWTH

## 2021-06-04 ENCOUNTER — HOSPITAL ENCOUNTER (OUTPATIENT)
Dept: RADIOLOGY | Facility: HOSPITAL | Age: 66
Discharge: HOME | End: 2021-06-04
Attending: FAMILY MEDICINE
Payer: MEDICARE

## 2021-06-04 DIAGNOSIS — R10.84 GENERALIZED ABDOMINAL PAIN: ICD-10-CM

## 2021-06-04 PROCEDURE — 76700 US ABDOMEN COMPLETE: ICD-10-PCS | Mod: 26,,, | Performed by: RADIOLOGY

## 2021-06-04 PROCEDURE — 76700 US EXAM ABDOM COMPLETE: CPT | Mod: 26,,, | Performed by: RADIOLOGY

## 2021-06-04 PROCEDURE — 76700 US EXAM ABDOM COMPLETE: CPT | Mod: TC

## 2021-06-05 ENCOUNTER — PATIENT MESSAGE (OUTPATIENT)
Dept: FAMILY MEDICINE | Facility: CLINIC | Age: 66
End: 2021-06-05

## 2021-06-07 ENCOUNTER — PATIENT OUTREACH (OUTPATIENT)
Dept: ADMINISTRATIVE | Facility: OTHER | Age: 66
End: 2021-06-07

## 2021-06-07 DIAGNOSIS — Z12.31 ENCOUNTER FOR SCREENING MAMMOGRAM FOR MALIGNANT NEOPLASM OF BREAST: Primary | ICD-10-CM

## 2021-06-08 ENCOUNTER — OFFICE VISIT (OUTPATIENT)
Dept: OBSTETRICS AND GYNECOLOGY | Facility: CLINIC | Age: 66
End: 2021-06-08
Payer: MEDICARE

## 2021-06-08 VITALS
DIASTOLIC BLOOD PRESSURE: 84 MMHG | HEIGHT: 63 IN | WEIGHT: 265 LBS | BODY MASS INDEX: 46.95 KG/M2 | SYSTOLIC BLOOD PRESSURE: 124 MMHG

## 2021-06-08 DIAGNOSIS — R10.2 ACUTE PELVIC PAIN, FEMALE: Primary | ICD-10-CM

## 2021-06-08 DIAGNOSIS — R39.9 UTI SYMPTOMS: ICD-10-CM

## 2021-06-08 DIAGNOSIS — N81.2 INCOMPLETE UTEROVAGINAL PROLAPSE: ICD-10-CM

## 2021-06-08 DIAGNOSIS — Z12.4 CERVICAL CANCER SCREENING: ICD-10-CM

## 2021-06-08 LAB
BILIRUB UR QL STRIP: NEGATIVE
CLARITY UR REFRACT.AUTO: ABNORMAL
COLOR UR AUTO: YELLOW
GLUCOSE UR QL STRIP: NEGATIVE
HGB UR QL STRIP: ABNORMAL
KETONES UR QL STRIP: NEGATIVE
LEUKOCYTE ESTERASE UR QL STRIP: ABNORMAL
MICROSCOPIC COMMENT: NORMAL
NITRITE UR QL STRIP: NEGATIVE
PH UR STRIP: 5 [PH] (ref 5–8)
PROT UR QL STRIP: NEGATIVE
RBC #/AREA URNS AUTO: 1 /HPF (ref 0–4)
SP GR UR STRIP: 1.01 (ref 1–1.03)
SQUAMOUS #/AREA URNS AUTO: 1 /HPF
URN SPEC COLLECT METH UR: ABNORMAL
WBC #/AREA URNS AUTO: 2 /HPF (ref 0–5)

## 2021-06-08 PROCEDURE — 99203 OFFICE O/P NEW LOW 30 MIN: CPT | Mod: S$PBB,,, | Performed by: OBSTETRICS & GYNECOLOGY

## 2021-06-08 PROCEDURE — 99203 PR OFFICE/OUTPT VISIT, NEW, LEVL III, 30-44 MIN: ICD-10-PCS | Mod: S$PBB,,, | Performed by: OBSTETRICS & GYNECOLOGY

## 2021-06-08 PROCEDURE — 87086 URINE CULTURE/COLONY COUNT: CPT | Performed by: OBSTETRICS & GYNECOLOGY

## 2021-06-08 PROCEDURE — 87624 HPV HI-RISK TYP POOLED RSLT: CPT | Performed by: OBSTETRICS & GYNECOLOGY

## 2021-06-08 PROCEDURE — 99999 PR PBB SHADOW E&M-EST. PATIENT-LVL IV: ICD-10-PCS | Mod: PBBFAC,,, | Performed by: OBSTETRICS & GYNECOLOGY

## 2021-06-08 PROCEDURE — 88175 CYTOPATH C/V AUTO FLUID REDO: CPT | Performed by: OBSTETRICS & GYNECOLOGY

## 2021-06-08 PROCEDURE — 87491 CHLMYD TRACH DNA AMP PROBE: CPT | Performed by: OBSTETRICS & GYNECOLOGY

## 2021-06-08 PROCEDURE — 81001 URINALYSIS AUTO W/SCOPE: CPT | Performed by: OBSTETRICS & GYNECOLOGY

## 2021-06-08 PROCEDURE — 87591 N.GONORRHOEAE DNA AMP PROB: CPT | Mod: 59 | Performed by: OBSTETRICS & GYNECOLOGY

## 2021-06-08 PROCEDURE — 87481 CANDIDA DNA AMP PROBE: CPT | Mod: 59 | Performed by: OBSTETRICS & GYNECOLOGY

## 2021-06-08 PROCEDURE — 99999 PR PBB SHADOW E&M-EST. PATIENT-LVL IV: CPT | Mod: PBBFAC,,, | Performed by: OBSTETRICS & GYNECOLOGY

## 2021-06-08 PROCEDURE — 99214 OFFICE O/P EST MOD 30 MIN: CPT | Mod: PBBFAC,PN | Performed by: OBSTETRICS & GYNECOLOGY

## 2021-06-08 PROCEDURE — 87661 TRICHOMONAS VAGINALIS AMPLIF: CPT | Performed by: OBSTETRICS & GYNECOLOGY

## 2021-06-09 LAB — BACTERIA UR CULT: NO GROWTH

## 2021-06-10 LAB
BACTERIAL VAGINOSIS DNA: NEGATIVE
CANDIDA GLABRATA DNA: NEGATIVE
CANDIDA KRUSEI DNA: NEGATIVE
CANDIDA RRNA VAG QL PROBE: NEGATIVE
T VAGINALIS RRNA GENITAL QL PROBE: NEGATIVE

## 2021-06-11 LAB
C TRACH DNA SPEC QL NAA+PROBE: NOT DETECTED
N GONORRHOEA DNA SPEC QL NAA+PROBE: NOT DETECTED

## 2021-06-15 LAB
FINAL PATHOLOGIC DIAGNOSIS: NORMAL
Lab: NORMAL

## 2021-06-15 RX ORDER — PANCRELIPASE 24000; 76000; 120000 [USP'U]/1; [USP'U]/1; [USP'U]/1
CAPSULE, DELAYED RELEASE PELLETS ORAL
Qty: 720 CAPSULE | Refills: 2 | Status: CANCELLED | OUTPATIENT
Start: 2021-06-15

## 2021-06-15 RX ORDER — WARFARIN SODIUM 5 MG/1
TABLET ORAL
Qty: 90 TABLET | Refills: 9 | Status: SHIPPED | OUTPATIENT
Start: 2021-06-15

## 2021-06-15 RX ORDER — OMEPRAZOLE 40 MG/1
CAPSULE, DELAYED RELEASE ORAL
Qty: 90 CAPSULE | Refills: 3 | Status: SHIPPED | OUTPATIENT
Start: 2021-06-15

## 2021-06-15 RX ORDER — ALENDRONATE SODIUM 70 MG/1
TABLET ORAL
Qty: 12 TABLET | Refills: 3 | Status: SHIPPED | OUTPATIENT
Start: 2021-06-15

## 2021-06-15 RX ORDER — PREDNISONE 5 MG/1
TABLET ORAL
Qty: 90 TABLET | Refills: 3 | Status: SHIPPED | OUTPATIENT
Start: 2021-06-15 | End: 2021-08-24

## 2021-06-15 RX ORDER — IRBESARTAN 150 MG/1
150 TABLET ORAL DAILY
Qty: 90 TABLET | Refills: 3 | Status: SHIPPED | OUTPATIENT
Start: 2021-06-15

## 2021-06-16 ENCOUNTER — HOSPITAL ENCOUNTER (OUTPATIENT)
Dept: RADIOLOGY | Facility: HOSPITAL | Age: 66
Discharge: HOME | End: 2021-06-16
Attending: OBSTETRICS & GYNECOLOGY
Payer: MEDICARE

## 2021-06-16 DIAGNOSIS — R10.2 ACUTE PELVIC PAIN, FEMALE: ICD-10-CM

## 2021-06-16 LAB
HPV HR 12 DNA SPEC QL NAA+PROBE: NEGATIVE
HPV16 AG SPEC QL: NEGATIVE
HPV18 DNA SPEC QL NAA+PROBE: NEGATIVE

## 2021-06-16 PROCEDURE — 76856 US EXAM PELVIC COMPLETE: CPT | Mod: TC,PN

## 2021-06-16 PROCEDURE — 76830 US PELVIS COMP WITH TRANSVAG NON-OB (XPD): ICD-10-PCS | Mod: 26,,, | Performed by: RADIOLOGY

## 2021-06-16 PROCEDURE — 76830 TRANSVAGINAL US NON-OB: CPT | Mod: 26,,, | Performed by: RADIOLOGY

## 2021-06-16 PROCEDURE — 76856 US EXAM PELVIC COMPLETE: CPT | Mod: 26,,, | Performed by: RADIOLOGY

## 2021-06-16 PROCEDURE — 76856 US PELVIS COMP WITH TRANSVAG NON-OB (XPD): ICD-10-PCS | Mod: 26,,, | Performed by: RADIOLOGY

## 2021-06-23 ENCOUNTER — OFFICE VISIT (OUTPATIENT)
Dept: UROGYNECOLOGY | Facility: CLINIC | Age: 66
End: 2021-06-23
Payer: MEDICARE

## 2021-06-23 VITALS
BODY MASS INDEX: 46.78 KG/M2 | WEIGHT: 264 LBS | SYSTOLIC BLOOD PRESSURE: 126 MMHG | HEIGHT: 63 IN | HEART RATE: 59 BPM | DIASTOLIC BLOOD PRESSURE: 64 MMHG

## 2021-06-23 DIAGNOSIS — N30.30 CHRONIC TRIGONITIS: ICD-10-CM

## 2021-06-23 DIAGNOSIS — N32.81 OAB (OVERACTIVE BLADDER): ICD-10-CM

## 2021-06-23 DIAGNOSIS — N81.2 INCOMPLETE UTEROVAGINAL PROLAPSE: ICD-10-CM

## 2021-06-23 DIAGNOSIS — N81.83 INCOMPETENCE OF RECTOVAGINAL TISSUE: Primary | ICD-10-CM

## 2021-06-23 LAB
BILIRUB SERPL-MCNC: NEGATIVE MG/DL
BLOOD URINE, POC: NEGATIVE
CLARITY, POC UA: CLEAR
COLOR, POC UA: YELLOW
GLUCOSE UR QL STRIP: NEGATIVE
KETONES UR QL STRIP: NEGATIVE
LEUKOCYTE ESTERASE URINE, POC: NORMAL
NITRITE, POC UA: NEGATIVE
PH, POC UA: 5
PROTEIN, POC: NEGATIVE
SPECIFIC GRAVITY, POC UA: 1.01
UROBILINOGEN, POC UA: NEGATIVE

## 2021-06-23 PROCEDURE — 99214 OFFICE O/P EST MOD 30 MIN: CPT | Mod: S$PBB,,, | Performed by: OBSTETRICS & GYNECOLOGY

## 2021-06-23 PROCEDURE — 81002 URINALYSIS NONAUTO W/O SCOPE: CPT | Mod: PBBFAC,PO | Performed by: OBSTETRICS & GYNECOLOGY

## 2021-06-23 PROCEDURE — 99214 PR OFFICE/OUTPT VISIT, EST, LEVL IV, 30-39 MIN: ICD-10-PCS | Mod: S$PBB,,, | Performed by: OBSTETRICS & GYNECOLOGY

## 2021-06-23 PROCEDURE — 99999 PR PBB SHADOW E&M-EST. PATIENT-LVL III: CPT | Mod: PBBFAC,,, | Performed by: OBSTETRICS & GYNECOLOGY

## 2021-06-23 PROCEDURE — 99999 PR PBB SHADOW E&M-EST. PATIENT-LVL III: ICD-10-PCS | Mod: PBBFAC,,, | Performed by: OBSTETRICS & GYNECOLOGY

## 2021-06-23 PROCEDURE — 99213 OFFICE O/P EST LOW 20 MIN: CPT | Mod: PBBFAC,PO | Performed by: OBSTETRICS & GYNECOLOGY

## 2021-06-23 RX ORDER — HYDROXYZINE HYDROCHLORIDE 25 MG/1
25 TABLET, FILM COATED ORAL NIGHTLY
Qty: 30 TABLET | Refills: 0 | Status: SHIPPED | OUTPATIENT
Start: 2021-06-23 | End: 2021-07-23

## 2021-06-23 RX ORDER — VIBEGRON 75 MG/1
75 TABLET, FILM COATED ORAL DAILY
Qty: 30 TABLET | Refills: 2 | Status: SHIPPED | OUTPATIENT
Start: 2021-06-23 | End: 2021-07-23

## 2021-07-14 ENCOUNTER — TELEPHONE (OUTPATIENT)
Dept: FAMILY MEDICINE | Facility: CLINIC | Age: 66
End: 2021-07-14

## 2021-08-16 ENCOUNTER — PATIENT MESSAGE (OUTPATIENT)
Dept: FAMILY MEDICINE | Facility: CLINIC | Age: 66
End: 2021-08-16

## 2021-08-16 RX ORDER — NITROFURANTOIN 25; 75 MG/1; MG/1
100 CAPSULE ORAL 2 TIMES DAILY
Qty: 20 CAPSULE | Refills: 0 | Status: SHIPPED | OUTPATIENT
Start: 2021-08-16 | End: 2021-12-06 | Stop reason: SDUPTHER

## 2021-09-29 ENCOUNTER — TELEPHONE (OUTPATIENT)
Dept: DERMATOLOGY | Facility: CLINIC | Age: 66
End: 2021-09-29

## 2021-09-29 ENCOUNTER — OFFICE VISIT (OUTPATIENT)
Dept: NEPHROLOGY | Facility: CLINIC | Age: 66
End: 2021-09-29
Payer: MEDICARE

## 2021-09-29 VITALS
SYSTOLIC BLOOD PRESSURE: 132 MMHG | WEIGHT: 255.5 LBS | OXYGEN SATURATION: 97 % | HEART RATE: 64 BPM | DIASTOLIC BLOOD PRESSURE: 78 MMHG | HEIGHT: 63 IN | BODY MASS INDEX: 45.27 KG/M2

## 2021-09-29 DIAGNOSIS — Z94.0 KIDNEY TRANSPLANT RECIPIENT: Primary | ICD-10-CM

## 2021-09-29 DIAGNOSIS — R30.0 DYSURIA: ICD-10-CM

## 2021-09-29 PROCEDURE — 99204 PR OFFICE/OUTPT VISIT, NEW, LEVL IV, 45-59 MIN: ICD-10-PCS | Mod: S$PBB,,, | Performed by: INTERNAL MEDICINE

## 2021-09-29 PROCEDURE — 99999 PR PBB SHADOW E&M-EST. PATIENT-LVL IV: CPT | Mod: PBBFAC,,, | Performed by: INTERNAL MEDICINE

## 2021-09-29 PROCEDURE — 99999 PR PBB SHADOW E&M-EST. PATIENT-LVL IV: ICD-10-PCS | Mod: PBBFAC,,, | Performed by: INTERNAL MEDICINE

## 2021-09-29 PROCEDURE — 99204 OFFICE O/P NEW MOD 45 MIN: CPT | Mod: S$PBB,,, | Performed by: INTERNAL MEDICINE

## 2021-09-29 PROCEDURE — 99214 OFFICE O/P EST MOD 30 MIN: CPT | Mod: PBBFAC,PO | Performed by: INTERNAL MEDICINE

## 2021-10-12 ENCOUNTER — TELEPHONE (OUTPATIENT)
Dept: FAMILY MEDICINE | Facility: CLINIC | Age: 66
End: 2021-10-12

## 2021-10-12 ENCOUNTER — PATIENT MESSAGE (OUTPATIENT)
Dept: FAMILY MEDICINE | Facility: CLINIC | Age: 66
End: 2021-10-12

## 2021-10-12 RX ORDER — NITROFURANTOIN (MACROCRYSTALS) 100 MG/1
100 CAPSULE ORAL EVERY 6 HOURS
Qty: 28 CAPSULE | Refills: 0 | Status: SHIPPED | OUTPATIENT
Start: 2021-10-12 | End: 2021-10-12

## 2021-10-12 RX ORDER — NITROFURANTOIN 25; 75 MG/1; MG/1
100 CAPSULE ORAL 2 TIMES DAILY
Qty: 14 CAPSULE | Refills: 0 | Status: SHIPPED | OUTPATIENT
Start: 2021-10-12 | End: 2021-10-19

## 2021-11-01 ENCOUNTER — OFFICE VISIT (OUTPATIENT)
Dept: FAMILY MEDICINE | Facility: CLINIC | Age: 66
End: 2021-11-01
Payer: MEDICARE

## 2021-11-01 VITALS
HEIGHT: 63 IN | BODY MASS INDEX: 45.07 KG/M2 | WEIGHT: 254.38 LBS | OXYGEN SATURATION: 97 % | RESPIRATION RATE: 18 BRPM | SYSTOLIC BLOOD PRESSURE: 122 MMHG | DIASTOLIC BLOOD PRESSURE: 68 MMHG | HEART RATE: 81 BPM

## 2021-11-01 DIAGNOSIS — Z12.31 SCREENING MAMMOGRAM FOR BREAST CANCER: ICD-10-CM

## 2021-11-01 DIAGNOSIS — Z78.0 ASYMPTOMATIC MENOPAUSAL STATE: Primary | ICD-10-CM

## 2021-11-01 DIAGNOSIS — I10 ESSENTIAL HYPERTENSION: ICD-10-CM

## 2021-11-01 DIAGNOSIS — N18.6 END STAGE RENAL DISEASE: ICD-10-CM

## 2021-11-01 PROCEDURE — 99215 OFFICE O/P EST HI 40 MIN: CPT | Mod: PBBFAC,PN | Performed by: NURSE PRACTITIONER

## 2021-11-01 PROCEDURE — 99999 PR PBB SHADOW E&M-EST. PATIENT-LVL V: CPT | Mod: PBBFAC,,, | Performed by: NURSE PRACTITIONER

## 2021-11-01 PROCEDURE — 99214 OFFICE O/P EST MOD 30 MIN: CPT | Mod: S$PBB,,, | Performed by: NURSE PRACTITIONER

## 2021-11-01 PROCEDURE — 99999 PR PBB SHADOW E&M-EST. PATIENT-LVL V: ICD-10-PCS | Mod: PBBFAC,,, | Performed by: NURSE PRACTITIONER

## 2021-11-01 PROCEDURE — 99214 PR OFFICE/OUTPT VISIT, EST, LEVL IV, 30-39 MIN: ICD-10-PCS | Mod: S$PBB,,, | Performed by: NURSE PRACTITIONER

## 2021-11-15 ENCOUNTER — HOSPITAL ENCOUNTER (OUTPATIENT)
Dept: RADIOLOGY | Facility: HOSPITAL | Age: 66
Discharge: HOME | End: 2021-11-15
Attending: NURSE PRACTITIONER
Payer: MEDICARE

## 2021-11-15 VITALS — BODY MASS INDEX: 44.3 KG/M2 | HEIGHT: 63 IN | WEIGHT: 250 LBS

## 2021-11-15 DIAGNOSIS — Z78.0 ASYMPTOMATIC MENOPAUSAL STATE: ICD-10-CM

## 2021-11-15 DIAGNOSIS — Z12.31 ENCOUNTER FOR SCREENING MAMMOGRAM FOR MALIGNANT NEOPLASM OF BREAST: ICD-10-CM

## 2021-11-15 PROCEDURE — 77080 DXA BONE DENSITY AXIAL: CPT | Mod: 26,,, | Performed by: RADIOLOGY

## 2021-11-15 PROCEDURE — 77067 SCR MAMMO BI INCL CAD: CPT | Mod: TC

## 2021-11-15 PROCEDURE — 77063 BREAST TOMOSYNTHESIS BI: CPT | Mod: 26,,, | Performed by: RADIOLOGY

## 2021-11-15 PROCEDURE — 77063 MAMMO DIGITAL SCREENING BILAT WITH TOMO: ICD-10-PCS | Mod: 26,,, | Performed by: RADIOLOGY

## 2021-11-15 PROCEDURE — 77080 DEXA BONE DENSITY SPINE HIP: ICD-10-PCS | Mod: 26,,, | Performed by: RADIOLOGY

## 2021-11-15 PROCEDURE — 77067 MAMMO DIGITAL SCREENING BILAT WITH TOMO: ICD-10-PCS | Mod: 26,,, | Performed by: RADIOLOGY

## 2021-11-15 PROCEDURE — 77067 SCR MAMMO BI INCL CAD: CPT | Mod: 26,,, | Performed by: RADIOLOGY

## 2021-11-15 PROCEDURE — 77080 DXA BONE DENSITY AXIAL: CPT | Mod: TC

## 2021-12-06 ENCOUNTER — OFFICE VISIT (OUTPATIENT)
Dept: FAMILY MEDICINE | Facility: CLINIC | Age: 66
End: 2021-12-06
Payer: MEDICARE

## 2021-12-06 VITALS
OXYGEN SATURATION: 97 % | BODY MASS INDEX: 44.56 KG/M2 | WEIGHT: 251.5 LBS | RESPIRATION RATE: 15 BRPM | HEART RATE: 89 BPM | SYSTOLIC BLOOD PRESSURE: 156 MMHG | DIASTOLIC BLOOD PRESSURE: 73 MMHG | HEIGHT: 63 IN

## 2021-12-06 DIAGNOSIS — Z79.899 DRUG-INDUCED IMMUNODEFICIENCY: ICD-10-CM

## 2021-12-06 DIAGNOSIS — K86.1 CHRONIC PANCREATITIS, UNSPECIFIED PANCREATITIS TYPE: ICD-10-CM

## 2021-12-06 DIAGNOSIS — D66 HEMOPHILIA: ICD-10-CM

## 2021-12-06 DIAGNOSIS — N25.81 SECONDARY HYPERPARATHYROIDISM: Primary | ICD-10-CM

## 2021-12-06 DIAGNOSIS — D84.821 DRUG-INDUCED IMMUNODEFICIENCY: ICD-10-CM

## 2021-12-06 DIAGNOSIS — E66.01 MORBID OBESITY: ICD-10-CM

## 2021-12-06 PROCEDURE — 99999 PR PBB SHADOW E&M-EST. PATIENT-LVL IV: ICD-10-PCS | Mod: PBBFAC,,, | Performed by: FAMILY MEDICINE

## 2021-12-06 PROCEDURE — 99214 OFFICE O/P EST MOD 30 MIN: CPT | Mod: PBBFAC,PN | Performed by: FAMILY MEDICINE

## 2021-12-06 PROCEDURE — 99999 PR PBB SHADOW E&M-EST. PATIENT-LVL IV: CPT | Mod: PBBFAC,,, | Performed by: FAMILY MEDICINE

## 2021-12-06 PROCEDURE — 99214 OFFICE O/P EST MOD 30 MIN: CPT | Mod: S$PBB,,, | Performed by: FAMILY MEDICINE

## 2021-12-06 PROCEDURE — 99214 PR OFFICE/OUTPT VISIT, EST, LEVL IV, 30-39 MIN: ICD-10-PCS | Mod: S$PBB,,, | Performed by: FAMILY MEDICINE

## 2021-12-06 RX ORDER — OXYCODONE HYDROCHLORIDE 20 MG/1
TABLET ORAL
COMMUNITY
Start: 2021-08-25

## 2021-12-06 RX ORDER — NITROFURANTOIN 25; 75 MG/1; MG/1
100 CAPSULE ORAL 2 TIMES DAILY
Qty: 20 CAPSULE | Refills: 0 | Status: SHIPPED | OUTPATIENT
Start: 2021-12-06 | End: 2021-12-16

## 2021-12-06 RX ORDER — EPINEPHRINE 0.15 MG/.3ML
0.15 INJECTION INTRAMUSCULAR
Qty: 2 EACH | Refills: 2 | Status: SHIPPED | OUTPATIENT
Start: 2021-12-06

## 2022-07-30 NOTE — MR AVS SNAPSHOT
[place] [facility] - [facility]mology  [address] [place] [address] Drive [address]  [place] [address]  Phone: [phone]                  Alida Chacon   2017 8:30 AM   Office Visit    Description:  Female : 1955   Provider:  Steffanie Caraballo MD   Department:  [place] [facility] - Ophthalmology           Reason for Visit     Post-op Evaluation           Diagnoses this Visit        Comments    Post-operative state    -  Primary     Nuclear sclerosis, bilateral         Myopia with astigmatism and presbyopia, bilateral                To Do List           Future Appointments        Provider Department Dept Phone    2017 2:00 PM MD [facility] [place] [facility] [phone]    3/2/2017 10:15 AM MD Ruby [address]ck[address] Ophthalmology [phone]      Goals (5 Years of Data)     None      Follow-Up and Disposition     Return for Post op visit.       These Medications        Disp Refills Start End    timolol maleate 0.5% (TIMOPTIC) 0.5 % Drop 5 mL 1 2017    Place 1 drop into the right eye 2 (two) times daily. - Right Eye    Pharmacy: [facility] [place] - [address] Ph #: [phone]         [facility]     [facility] Care Line - 24/7 Assistance  Registered nurses in the [facility] provide clinical advisement, health education, appointment booking, and other advisory services.  Call for this free service at [phone].             Medications           Message regarding Medications     Verify the changes and/or additions to your medication regime listed below are the same as discussed with your clinician today.  If any of these changes or additions are incorrect, please notify your healthcare provider.        START taking these NEW medications        Refills    timolol maleate 0.5% (TIMOPTIC) 0.5 % Drop 1    Sig: Place 1 drop into the right eye 2 (two) times daily.    Class:  Normal    Route: Right Eye           Verify that the below list of medications is an accurate representation of the medications you are currently taking.  If none reported, the list may be blank. If incorrect, please contact your healthcare provider. Carry this list with you in case of emergency.           Current Medications     alendronate (FOSAMAX) 70 MG tablet TK 1 T PO ONCE WEEKLY    besifloxacin (BESIVANCE) 0.6 % DrpS Place 1 drop into the right eye 4 (four) times daily. Start 1 day before surgery.    calcium carbonate (OS-JACQUELINE) 600 mg (1,500 mg) Tab Take 600 mg by mouth 2 (two) times daily with meals.    cetirizine (ZYRTEC) 10 MG tablet Take 10 mg by mouth daily as needed for Allergies.    CIPRODEX 0.3-0.1 % DrpS     CREON 24,000-76,000 -120,000 unit capsule     diazePAM (VALIUM) 5 MG tablet TK 1 T PO Q 8 H PRN    docusate sodium (COLACE) 100 MG capsule Take 100 mg by mouth 2 (two) times daily.    epinephrine (EPIPEN JR) 0.15 mg/0.3 mL pen injection Inject 0.15 mg into the muscle as needed for Anaphylaxis.    ergocalciferol (ERGOCALCIFEROL) 50,000 unit Cap TK 1 C PO  Q WEEKLY    ethacrynic acid (EDECRIN) 25 mg Tab Take 25 mg by mouth 2 (two) times daily.    irbesartan (AVAPRO) 150 MG tablet TK 1 T PO QD    levocetirizine (XYZAL) 5 MG tablet Take 5 mg by mouth every evening.    LINZESS 290 mcg Cap     loteprednol etabonate 0.5 % DrpG Place 1 drop into the right eye 4 (four) times daily. Start right after surgery.    magnesium oxide (MAG-OX) 400 mg tablet Take 400 mg by mouth once daily.    nepafenac (ILEVRO) 0.3 % DrpS Place 1 drop into the right eye once daily. Start 3 days before surgery.    omeprazole (PRILOSEC) 40 MG capsule TK 1 C PO  QD    ondansetron (ZOFRAN) 4 MG tablet Take 8 mg by mouth 2 (two) times daily.    oxycodone (ROXICODONE) 15 MG Tab TK 1 T PO QID PRN    OXYCONTIN 20 mg 12 hr tablet TK 1 T PO Q 12 H    OXYGEN-AIR DELIVERY SYSTEMS MISC 2 L by Nasal route nightly as needed.    predniSONE  (DELTASONE) 5 MG tablet TAKE ONE TABLET BY MOUTHY DAILY WITH FOOD    promethazine (PHENERGAN) 25 MG tablet Take 25 mg by mouth every 4 (four) hours as needed for Nausea.    tacrolimus (PROGRAF) 1 MG Cap TK 3 CS PO BID    timolol maleate 0.5% (TIMOPTIC) 0.5 % Drop Place 1 drop into the right eye 2 (two) times daily.    triazolam (HALCION) 0.25 MG Tab Take 0.125 mg by mouth nightly as needed.    warfarin (COUMADIN) 7.5 MG tablet TK 1 T PO QD AT 5 PM           Clinical Reference Information           Allergies as of 1/19/2017     Versed [Midazolam]    Compazine [Prochlorperazine Edisylate]    Cortisone    Cytoxan [Cyclophosphamide]    Lasix [Furosemide]    Morphine    Penicillins    Preservatives [Preservative]    Sulfa (Sulfonamide Antibiotics)    Tetracyclines    Vicodin [Hydrocodone-acetaminophen]      Immunizations Administered on Date of Encounter - 1/19/2017     None      Instructions      Continue Ilevro once daily. QD, add Timolol BID through Sunday am.    Continue other surgery drops 4x per day (Besifloxacin, Lotemax).    Use Timolol (pressure drop, yellow lid) 2x per day until Sunday morning then stop. CONTINUE ALL OTHER DROPS.     Call MD immediately if signs of infection occur (pain, redness, blurred vision). Do not wait for next appointment.    Call MD immediately if you experience new floaters/shadows in your vision, flashes of light, or a curtain or veil appearing to in your vision.    Cell number [phone].          dependent (less than 25% patients effort)